# Patient Record
Sex: MALE | Race: WHITE | NOT HISPANIC OR LATINO | Employment: UNEMPLOYED | URBAN - NONMETROPOLITAN AREA
[De-identification: names, ages, dates, MRNs, and addresses within clinical notes are randomized per-mention and may not be internally consistent; named-entity substitution may affect disease eponyms.]

---

## 2019-03-05 ENCOUNTER — HOSPITAL ENCOUNTER (OUTPATIENT)
Facility: HOSPITAL | Age: 74
Setting detail: OBSERVATION
Discharge: HOME | End: 2019-03-08
Attending: EMERGENCY MEDICINE | Admitting: INTERNAL MEDICINE

## 2019-03-05 ENCOUNTER — APPOINTMENT (OUTPATIENT)
Dept: CT IMAGING | Facility: HOSPITAL | Age: 74
End: 2019-03-05

## 2019-03-05 ENCOUNTER — APPOINTMENT (OUTPATIENT)
Dept: GENERAL RADIOLOGY | Facility: HOSPITAL | Age: 74
End: 2019-03-05

## 2019-03-05 DIAGNOSIS — J18.9 HCAP (HEALTHCARE-ASSOCIATED PNEUMONIA): Primary | ICD-10-CM

## 2019-03-05 LAB
A-A DO2: 21.4 MMHG (ref 0–300)
ALBUMIN SERPL-MCNC: 4.1 G/DL (ref 3.4–4.8)
ALBUMIN/GLOB SERPL: 1.1 G/DL (ref 1.5–2.5)
ALP SERPL-CCNC: 110 U/L (ref 40–129)
ALT SERPL W P-5'-P-CCNC: 30 U/L (ref 10–44)
ANION GAP SERPL CALCULATED.3IONS-SCNC: 10.3 MMOL/L (ref 3.6–11.2)
ARTERIAL PATENCY WRIST A: POSITIVE
AST SERPL-CCNC: 32 U/L (ref 10–34)
ATMOSPHERIC PRESS: 732 MMHG
BASE EXCESS BLDA CALC-SCNC: 0 MMOL/L
BASOPHILS # BLD AUTO: 0.08 10*3/MM3 (ref 0–0.3)
BASOPHILS NFR BLD AUTO: 1.1 % (ref 0–2)
BDY SITE: ABNORMAL
BILIRUB SERPL-MCNC: 0.8 MG/DL (ref 0.2–1.8)
BNP SERPL-MCNC: 221 PG/ML (ref 0–100)
BODY TEMPERATURE: 98.6 C
BUN BLD-MCNC: 18 MG/DL (ref 7–21)
BUN/CREAT SERPL: 17.5 (ref 7–25)
CALCIUM SPEC-SCNC: 9.3 MG/DL (ref 7.7–10)
CHLORIDE SERPL-SCNC: 102 MMOL/L (ref 99–112)
CK MB SERPL-CCNC: 2.36 NG/ML (ref 0–5)
CK MB SERPL-RTO: 3.2 % (ref 0–3)
CK SERPL-CCNC: 74 U/L (ref 24–204)
CO2 SERPL-SCNC: 25.7 MMOL/L (ref 24.3–31.9)
COHGB MFR BLD: 1.6 % (ref 0–5)
CREAT BLD-MCNC: 1.03 MG/DL (ref 0.43–1.29)
CRP SERPL-MCNC: 2.1 MG/DL (ref 0–0.99)
CRP SERPL-MCNC: 2.88 MG/DL (ref 0–0.99)
D-LACTATE SERPL-SCNC: 1.5 MMOL/L (ref 0.5–2)
DEPRECATED RDW RBC AUTO: 60.1 FL (ref 37–54)
EOSINOPHIL # BLD AUTO: 0.15 10*3/MM3 (ref 0–0.7)
EOSINOPHIL NFR BLD AUTO: 2.1 % (ref 0–7)
ERYTHROCYTE [DISTWIDTH] IN BLOOD BY AUTOMATED COUNT: 18.5 % (ref 11.5–14.5)
FLUAV AG NPH QL: NEGATIVE
FLUBV AG NPH QL IA: NEGATIVE
GFR SERPL CREATININE-BSD FRML MDRD: 70 ML/MIN/1.73
GLOBULIN UR ELPH-MCNC: 3.6 GM/DL
GLUCOSE BLD-MCNC: 111 MG/DL (ref 70–110)
HCO3 BLDA-SCNC: 22.6 MMOL/L (ref 22–26)
HCT VFR BLD AUTO: 41.3 % (ref 42–52)
HCT VFR BLD CALC: 38 % (ref 42–52)
HGB BLD-MCNC: 13.5 G/DL (ref 14–18)
HGB BLDA-MCNC: 13 G/DL (ref 12–16)
HOLD SPECIMEN: NORMAL
HOROWITZ INDEX BLD+IHG-RTO: 21 %
IMM GRANULOCYTES # BLD AUTO: 0.04 10*3/MM3 (ref 0–0.03)
IMM GRANULOCYTES NFR BLD AUTO: 0.6 % (ref 0–0.5)
LYMPHOCYTES # BLD AUTO: 1.19 10*3/MM3 (ref 1–3)
LYMPHOCYTES NFR BLD AUTO: 17 % (ref 16–46)
M PNEUMO IGM SER QL: NEGATIVE
MCH RBC QN AUTO: 30.7 PG (ref 27–33)
MCHC RBC AUTO-ENTMCNC: 32.7 G/DL (ref 33–37)
MCV RBC AUTO: 93.9 FL (ref 80–94)
METHGB BLD QL: 0.4 % (ref 0–3)
MODALITY: ABNORMAL
MONOCYTES # BLD AUTO: 0.69 10*3/MM3 (ref 0.1–0.9)
MONOCYTES NFR BLD AUTO: 9.9 % (ref 0–12)
NEUTROPHILS # BLD AUTO: 4.83 10*3/MM3 (ref 1.4–6.5)
NEUTROPHILS NFR BLD AUTO: 69.3 % (ref 40–75)
NOTE: ABNORMAL
OSMOLALITY SERPL CALC.SUM OF ELEC: 278.3 MOSM/KG (ref 273–305)
OXYHGB MFR BLDV: 94.4 % (ref 85–100)
PCO2 BLDA: 30.9 MM HG (ref 35–45)
PCO2 TEMP ADJ BLD: ABNORMAL MM HG (ref 35–48)
PH BLDA: 7.48 PH UNITS (ref 7.35–7.45)
PH, TEMP CORRECTED: ABNORMAL PH UNITS (ref 7.35–7.45)
PLATELET # BLD AUTO: 303 10*3/MM3 (ref 130–400)
PMV BLD AUTO: 10.5 FL (ref 6–10)
PO2 BLDA: 85.4 MM HG (ref 80–100)
PO2 TEMP ADJ BLD: ABNORMAL MM HG (ref 83–108)
POTASSIUM BLD-SCNC: 3.9 MMOL/L (ref 3.5–5.3)
PROT SERPL-MCNC: 7.7 G/DL (ref 6–8)
RBC # BLD AUTO: 4.4 10*6/MM3 (ref 4.7–6.1)
SAO2 % BLDCOA: 96.3 % (ref 90–100)
SODIUM BLD-SCNC: 138 MMOL/L (ref 135–153)
TROPONIN I SERPL-MCNC: 0.05 NG/ML
TROPONIN I SERPL-MCNC: 0.06 NG/ML
WBC NRBC COR # BLD: 6.98 10*3/MM3 (ref 4.5–12.5)
WHOLE BLOOD HOLD SPECIMEN: NORMAL
WHOLE BLOOD HOLD SPECIMEN: NORMAL

## 2019-03-05 PROCEDURE — 71045 X-RAY EXAM CHEST 1 VIEW: CPT

## 2019-03-05 PROCEDURE — 83050 HGB METHEMOGLOBIN QUAN: CPT | Performed by: PHYSICIAN ASSISTANT

## 2019-03-05 PROCEDURE — 25010000002 METHYLPREDNISOLONE PER 125 MG: Performed by: PHYSICIAN ASSISTANT

## 2019-03-05 PROCEDURE — 86140 C-REACTIVE PROTEIN: CPT | Performed by: PHYSICIAN ASSISTANT

## 2019-03-05 PROCEDURE — 25010000002 IOPAMIDOL 61 % SOLUTION: Performed by: EMERGENCY MEDICINE

## 2019-03-05 PROCEDURE — 93010 ELECTROCARDIOGRAM REPORT: CPT | Performed by: INTERNAL MEDICINE

## 2019-03-05 PROCEDURE — 71045 X-RAY EXAM CHEST 1 VIEW: CPT | Performed by: RADIOLOGY

## 2019-03-05 PROCEDURE — 87040 BLOOD CULTURE FOR BACTERIA: CPT | Performed by: PHYSICIAN ASSISTANT

## 2019-03-05 PROCEDURE — 94799 UNLISTED PULMONARY SVC/PX: CPT

## 2019-03-05 PROCEDURE — 25010000002 VANCOMYCIN 5 G RECONSTITUTED SOLUTION 5,000 MG VIAL: Performed by: PHYSICIAN ASSISTANT

## 2019-03-05 PROCEDURE — 94640 AIRWAY INHALATION TREATMENT: CPT

## 2019-03-05 PROCEDURE — 87147 CULTURE TYPE IMMUNOLOGIC: CPT | Performed by: PHYSICIAN ASSISTANT

## 2019-03-05 PROCEDURE — 87804 INFLUENZA ASSAY W/OPTIC: CPT | Performed by: PHYSICIAN ASSISTANT

## 2019-03-05 PROCEDURE — 71275 CT ANGIOGRAPHY CHEST: CPT | Performed by: RADIOLOGY

## 2019-03-05 PROCEDURE — 83605 ASSAY OF LACTIC ACID: CPT | Performed by: PHYSICIAN ASSISTANT

## 2019-03-05 PROCEDURE — 99285 EMERGENCY DEPT VISIT HI MDM: CPT

## 2019-03-05 PROCEDURE — G0378 HOSPITAL OBSERVATION PER HR: HCPCS

## 2019-03-05 PROCEDURE — 87077 CULTURE AEROBIC IDENTIFY: CPT | Performed by: PHYSICIAN ASSISTANT

## 2019-03-05 PROCEDURE — 25010000002 PIPERACILLIN-TAZOBACTAM: Performed by: PHYSICIAN ASSISTANT

## 2019-03-05 PROCEDURE — 36600 WITHDRAWAL OF ARTERIAL BLOOD: CPT | Performed by: PHYSICIAN ASSISTANT

## 2019-03-05 PROCEDURE — 87070 CULTURE OTHR SPECIMN AEROBIC: CPT | Performed by: PHYSICIAN ASSISTANT

## 2019-03-05 PROCEDURE — 82375 ASSAY CARBOXYHB QUANT: CPT | Performed by: PHYSICIAN ASSISTANT

## 2019-03-05 PROCEDURE — 82553 CREATINE MB FRACTION: CPT | Performed by: PHYSICIAN ASSISTANT

## 2019-03-05 PROCEDURE — 96365 THER/PROPH/DIAG IV INF INIT: CPT

## 2019-03-05 PROCEDURE — 87205 SMEAR GRAM STAIN: CPT | Performed by: PHYSICIAN ASSISTANT

## 2019-03-05 PROCEDURE — 86738 MYCOPLASMA ANTIBODY: CPT | Performed by: NURSE PRACTITIONER

## 2019-03-05 PROCEDURE — 80053 COMPREHEN METABOLIC PANEL: CPT | Performed by: EMERGENCY MEDICINE

## 2019-03-05 PROCEDURE — 96375 TX/PRO/DX INJ NEW DRUG ADDON: CPT

## 2019-03-05 PROCEDURE — 85025 COMPLETE CBC W/AUTO DIFF WBC: CPT | Performed by: EMERGENCY MEDICINE

## 2019-03-05 PROCEDURE — 87186 SC STD MICRODIL/AGAR DIL: CPT | Performed by: PHYSICIAN ASSISTANT

## 2019-03-05 PROCEDURE — 25010000002 DIPHENHYDRAMINE PER 50 MG: Performed by: PHYSICIAN ASSISTANT

## 2019-03-05 PROCEDURE — 25010000002 CEFTRIAXONE: Performed by: NURSE PRACTITIONER

## 2019-03-05 PROCEDURE — 71275 CT ANGIOGRAPHY CHEST: CPT

## 2019-03-05 PROCEDURE — 82550 ASSAY OF CK (CPK): CPT | Performed by: PHYSICIAN ASSISTANT

## 2019-03-05 PROCEDURE — 82805 BLOOD GASES W/O2 SATURATION: CPT | Performed by: PHYSICIAN ASSISTANT

## 2019-03-05 PROCEDURE — 87150 DNA/RNA AMPLIFIED PROBE: CPT | Performed by: PHYSICIAN ASSISTANT

## 2019-03-05 PROCEDURE — 84484 ASSAY OF TROPONIN QUANT: CPT | Performed by: EMERGENCY MEDICINE

## 2019-03-05 PROCEDURE — 96367 TX/PROPH/DG ADDL SEQ IV INF: CPT

## 2019-03-05 PROCEDURE — 83880 ASSAY OF NATRIURETIC PEPTIDE: CPT | Performed by: EMERGENCY MEDICINE

## 2019-03-05 PROCEDURE — 36415 COLL VENOUS BLD VENIPUNCTURE: CPT

## 2019-03-05 PROCEDURE — 93005 ELECTROCARDIOGRAM TRACING: CPT | Performed by: EMERGENCY MEDICINE

## 2019-03-05 PROCEDURE — 84484 ASSAY OF TROPONIN QUANT: CPT | Performed by: PHYSICIAN ASSISTANT

## 2019-03-05 RX ORDER — IPRATROPIUM BROMIDE AND ALBUTEROL SULFATE 2.5; .5 MG/3ML; MG/3ML
3 SOLUTION RESPIRATORY (INHALATION) ONCE
Status: COMPLETED | OUTPATIENT
Start: 2019-03-05 | End: 2019-03-05

## 2019-03-05 RX ORDER — METHYLPREDNISOLONE SODIUM SUCCINATE 125 MG/2ML
125 INJECTION, POWDER, LYOPHILIZED, FOR SOLUTION INTRAMUSCULAR; INTRAVENOUS ONCE
Status: COMPLETED | OUTPATIENT
Start: 2019-03-05 | End: 2019-03-05

## 2019-03-05 RX ORDER — IPRATROPIUM BROMIDE AND ALBUTEROL SULFATE 2.5; .5 MG/3ML; MG/3ML
3 SOLUTION RESPIRATORY (INHALATION)
Status: DISCONTINUED | OUTPATIENT
Start: 2019-03-05 | End: 2019-03-06

## 2019-03-05 RX ORDER — SODIUM CHLORIDE 0.9 % (FLUSH) 0.9 %
10 SYRINGE (ML) INJECTION AS NEEDED
Status: DISCONTINUED | OUTPATIENT
Start: 2019-03-05 | End: 2019-03-08 | Stop reason: HOSPADM

## 2019-03-05 RX ORDER — DIPHENHYDRAMINE HYDROCHLORIDE 50 MG/ML
25 INJECTION INTRAMUSCULAR; INTRAVENOUS ONCE
Status: COMPLETED | OUTPATIENT
Start: 2019-03-05 | End: 2019-03-05

## 2019-03-05 RX ADMIN — VANCOMYCIN HYDROCHLORIDE 1500 MG: 5 INJECTION, POWDER, LYOPHILIZED, FOR SOLUTION INTRAVENOUS at 19:25

## 2019-03-05 RX ADMIN — IPRATROPIUM BROMIDE AND ALBUTEROL SULFATE 3 ML: .5; 3 SOLUTION RESPIRATORY (INHALATION) at 18:39

## 2019-03-05 RX ADMIN — PIPERACILLIN SODIUM,TAZOBACTAM SODIUM 3.38 G: 3; .375 INJECTION, POWDER, FOR SOLUTION INTRAVENOUS at 18:52

## 2019-03-05 RX ADMIN — DIPHENHYDRAMINE HYDROCHLORIDE 25 MG: 50 INJECTION INTRAMUSCULAR; INTRAVENOUS at 20:24

## 2019-03-05 RX ADMIN — IPRATROPIUM BROMIDE AND ALBUTEROL SULFATE 3 ML: .5; 3 SOLUTION RESPIRATORY (INHALATION) at 20:16

## 2019-03-05 RX ADMIN — CEFTRIAXONE 2 G: 2 INJECTION, POWDER, FOR SOLUTION INTRAMUSCULAR; INTRAVENOUS at 23:30

## 2019-03-05 RX ADMIN — IOPAMIDOL 100 ML: 612 INJECTION, SOLUTION INTRAVENOUS at 21:08

## 2019-03-05 RX ADMIN — METHYLPREDNISOLONE SODIUM SUCCINATE 125 MG: 125 INJECTION, POWDER, FOR SOLUTION INTRAMUSCULAR; INTRAVENOUS at 20:24

## 2019-03-05 NOTE — ED PROVIDER NOTES
Subjective     Shortness of Breath   Severity:  Severe  Onset quality:  Gradual  Duration:  1 week  Timing:  Constant  Progression:  Worsening  Chronicity:  New  Context comment:  Patient was recently admitted to [facility] for pneumonia.  He states he got out last Thursday or Friday and is not any better.  Associated symptoms: fever (previously), sputum production and wheezing    Associated symptoms: no abdominal pain and no chest pain        Review of Systems   Constitutional: Positive for fever (previously).   HENT: Negative.    Respiratory: Positive for sputum production, shortness of breath and wheezing.    Cardiovascular: Negative.  Negative for chest pain.   Gastrointestinal: Negative.  Negative for abdominal pain.   Endocrine: Negative.    Genitourinary: Negative.  Negative for dysuria.   Skin: Negative.    Neurological: Negative.    Psychiatric/Behavioral: Negative.    All other systems reviewed and are negative.      Past Medical History:   Diagnosis Date   • Arthritis    • Atrial fibrillation (CMS/HCC)    • Cancer (CMS/HCC)    • COPD (chronic obstructive pulmonary disease) (CMS/HCC)    • Diabetes mellitus (CMS/HCC)    • Hyperlipidemia    • Hypertension    • Myocardial infarction (CMS/HCC)    • Pneumonia        Allergies   Allergen Reactions   • Other Unknown (See Comments)     Patient stated he had a reaction to an antibiotic he took for pneumonia but cannot remember the name       Past Surgical History:   Procedure Laterality Date   • CARDIOVERSION     • CHOLECYSTECTOMY     • CORONARY STENT PLACEMENT         History reviewed. No pertinent family history.    Social History     Socioeconomic History   • Marital status: Single     Spouse name: Not on file   • Number of children: Not on file   • Years of education: Not on file   • Highest education level: Not on file   Tobacco Use   • Smoking status: Former Smoker     Years: 45.00   Substance and Sexual Activity   • Alcohol use: No     Frequency:  Never   • Drug use: No   • Sexual activity: Defer           Objective   Physical Exam   Constitutional: He is oriented to person, place, and time. He appears well-developed and well-nourished. He appears distressed.   HENT:   Head: Normocephalic and atraumatic.   Right Ear: External ear normal.   Left Ear: External ear normal.   Nose: Nose normal.   Eyes: Conjunctivae and EOM are normal. Pupils are equal, round, and reactive to light.   Neck: Normal range of motion. Neck supple. No JVD present. No tracheal deviation present.   Cardiovascular: Normal rate, regular rhythm and normal heart sounds.   No murmur heard.  Pulmonary/Chest: Breath sounds normal. He is in respiratory distress. He has no wheezes.   Abdominal: Soft. Bowel sounds are normal. There is no tenderness.   Musculoskeletal: Normal range of motion. He exhibits no edema or deformity.   Neurological: He is alert and oriented to person, place, and time. No cranial nerve deficit.   Skin: Skin is warm and dry. No rash noted. He is not diaphoretic. No erythema. No pallor.   Psychiatric: He has a normal mood and affect. His behavior is normal. Thought content normal.   Nursing note and vitals reviewed.      Procedures           ED Course  ED Course as of Mar 06 0442   Tue Mar 05, 2019   2024 Patient complains of SOB and worsening wheezing while getting vancomycin. Patient states that this also happens when he gets vancomycin. Patient stated that he did not tell the nurses because he wanted to see if it would happen again. Patient had increased respirations on exam.  Patient given breathing tx, solumedrol, and benadryl.   [ML]   2105 Patient noted to have normal respirations on exam. States that he is feeling much better.  Vancomycin was stopped. Patient noted to remind all staff that he is allergic.    [ML]   2203 No PE. Emphysematous changes in both lung apices are present. Patchy bilateral somewhat nodular nonspecific infiiltrates as discused above are  supsivious for pneumonia but cannot exclude underlying granulomatous or metastaic disease.  A 4mm soft tissue density pulm nodule in the right upper lobe is present.  Partially calcified mediastinal lymph nodes are nonspecific possibly due to old granulomatous disease per [facility]  CT Chest Pulmonary Embolism With Contrast [ML]   2230 I have reviewed medical records from [facility].  Per medical records patient was admitted back in January for atrial flutter with RVR.  At that time he was evaluated by cardiology and placed on Eliquis and Coreg.  Patient appears to have return to the ER on February 28 for shortness of breath and palpitations.  At that time he also was in atrial flutter with RVR and was corrected with Cardizem all in the ER.  Patient also had bilateral lower lung infiltrates suspicious for pneumonia on chest x-ray while in ER.  He was admitted for atrial flutter with RVR and healthcare acquired pneumonia.  Patient was started on Vanco and Zosyn.  Upon further evaluation of the chest x-ray it was not evident that he had pneumonia and DC'd Vanco and Zosyn.  He was evaluated then again by cardiology and discharged home on March 1.  He was discharged home on his normal medication such as Norco, Synthroid, omeprazole, Symbicort, Lasix, aspirin, metoprolol, atorvastatin, Plavix, Eliquis, albuterol inhaler and promethazine.  He was to follow-up with cardiology for further titration of medications.  Patient tells me that he did not feel like he was getting any better and does not want to return to [place].    Medical records will be placed in chart as well.  [ML]   3389 Discussed with Kanwal she will accept to observation for Dr. Mariano   [ML]      ED Course User Index  [ML] Ambika Lui PA                  [facility]      Final diagnoses:   HCAP (healthcare-associated pneumonia)            Ambika Lui PA  03/06/19 7033

## 2019-03-05 NOTE — ED NOTES
Called [facility] trying to get medical records      Tremaine Farris  03/05/19 5271

## 2019-03-06 PROBLEM — I50.9 CHF (CONGESTIVE HEART FAILURE) (HCC): Status: ACTIVE | Noted: 2019-03-06

## 2019-03-06 PROBLEM — J44.9 COPD (CHRONIC OBSTRUCTIVE PULMONARY DISEASE) (HCC): Status: ACTIVE | Noted: 2019-03-06

## 2019-03-06 LAB
ANION GAP SERPL CALCULATED.3IONS-SCNC: 9.4 MMOL/L (ref 3.6–11.2)
BACTERIA BLD CULT: ABNORMAL
BASOPHILS # BLD AUTO: 0.01 10*3/MM3 (ref 0–0.3)
BASOPHILS NFR BLD AUTO: 0.2 % (ref 0–2)
BUN BLD-MCNC: 15 MG/DL (ref 7–21)
BUN/CREAT SERPL: 15.8 (ref 7–25)
CALCIUM SPEC-SCNC: 8.8 MG/DL (ref 7.7–10)
CHLORIDE SERPL-SCNC: 105 MMOL/L (ref 99–112)
CO2 SERPL-SCNC: 21.6 MMOL/L (ref 24.3–31.9)
CREAT BLD-MCNC: 0.95 MG/DL (ref 0.43–1.29)
CRP SERPL-MCNC: 2.02 MG/DL (ref 0–0.99)
DEPRECATED RDW RBC AUTO: 62.9 FL (ref 37–54)
EOSINOPHIL # BLD AUTO: 0 10*3/MM3 (ref 0–0.7)
EOSINOPHIL NFR BLD AUTO: 0 % (ref 0–7)
ERYTHROCYTE [DISTWIDTH] IN BLOOD BY AUTOMATED COUNT: 18.7 % (ref 11.5–14.5)
GFR SERPL CREATININE-BSD FRML MDRD: 77 ML/MIN/1.73
GLUCOSE BLD-MCNC: 148 MG/DL (ref 70–110)
HCT VFR BLD AUTO: 40.1 % (ref 42–52)
HGB BLD-MCNC: 12.7 G/DL (ref 14–18)
IMM GRANULOCYTES # BLD AUTO: 0.01 10*3/MM3 (ref 0–0.03)
IMM GRANULOCYTES NFR BLD AUTO: 0.2 % (ref 0–0.5)
L PNEUMO1 AG UR QL IA: NEGATIVE
LYMPHOCYTES # BLD AUTO: 0.54 10*3/MM3 (ref 1–3)
LYMPHOCYTES NFR BLD AUTO: 8.8 % (ref 16–46)
MCH RBC QN AUTO: 30.2 PG (ref 27–33)
MCHC RBC AUTO-ENTMCNC: 31.7 G/DL (ref 33–37)
MCV RBC AUTO: 95.5 FL (ref 80–94)
MONOCYTES # BLD AUTO: 0.1 10*3/MM3 (ref 0.1–0.9)
MONOCYTES NFR BLD AUTO: 1.6 % (ref 0–12)
NEUTROPHILS # BLD AUTO: 5.49 10*3/MM3 (ref 1.4–6.5)
NEUTROPHILS NFR BLD AUTO: 89.2 % (ref 40–75)
OSMOLALITY SERPL CALC.SUM OF ELEC: 275.5 MOSM/KG (ref 273–305)
PLATELET # BLD AUTO: 257 10*3/MM3 (ref 130–400)
PMV BLD AUTO: 10.6 FL (ref 6–10)
POTASSIUM BLD-SCNC: 3.9 MMOL/L (ref 3.5–5.3)
RBC # BLD AUTO: 4.2 10*6/MM3 (ref 4.7–6.1)
SODIUM BLD-SCNC: 136 MMOL/L (ref 135–153)
TROPONIN I SERPL-MCNC: 0.03 NG/ML
TROPONIN I SERPL-MCNC: 0.04 NG/ML
WBC NRBC COR # BLD: 6.15 10*3/MM3 (ref 4.5–12.5)

## 2019-03-06 PROCEDURE — G0378 HOSPITAL OBSERVATION PER HR: HCPCS

## 2019-03-06 PROCEDURE — 25010000002 FUROSEMIDE PER 20 MG: Performed by: INTERNAL MEDICINE

## 2019-03-06 PROCEDURE — 25010000002 CEFTRIAXONE: Performed by: NURSE PRACTITIONER

## 2019-03-06 PROCEDURE — 86140 C-REACTIVE PROTEIN: CPT | Performed by: NURSE PRACTITIONER

## 2019-03-06 PROCEDURE — 96367 TX/PROPH/DG ADDL SEQ IV INF: CPT

## 2019-03-06 PROCEDURE — 84484 ASSAY OF TROPONIN QUANT: CPT | Performed by: NURSE PRACTITIONER

## 2019-03-06 PROCEDURE — 80048 BASIC METABOLIC PNL TOTAL CA: CPT | Performed by: NURSE PRACTITIONER

## 2019-03-06 PROCEDURE — 25010000002 METHYLPREDNISOLONE PER 125 MG: Performed by: INTERNAL MEDICINE

## 2019-03-06 PROCEDURE — 87899 AGENT NOS ASSAY W/OPTIC: CPT | Performed by: NURSE PRACTITIONER

## 2019-03-06 PROCEDURE — 96376 TX/PRO/DX INJ SAME DRUG ADON: CPT

## 2019-03-06 PROCEDURE — 99204 OFFICE O/P NEW MOD 45 MIN: CPT | Performed by: NURSE PRACTITIONER

## 2019-03-06 PROCEDURE — 94799 UNLISTED PULMONARY SVC/PX: CPT

## 2019-03-06 PROCEDURE — 96375 TX/PRO/DX INJ NEW DRUG ADDON: CPT

## 2019-03-06 PROCEDURE — 84484 ASSAY OF TROPONIN QUANT: CPT | Performed by: PHYSICIAN ASSISTANT

## 2019-03-06 PROCEDURE — 85025 COMPLETE CBC W/AUTO DIFF WBC: CPT | Performed by: NURSE PRACTITIONER

## 2019-03-06 RX ORDER — HYDROCHLOROTHIAZIDE 12.5 MG/1
25 CAPSULE, GELATIN COATED ORAL DAILY
Status: DISCONTINUED | OUTPATIENT
Start: 2019-03-06 | End: 2019-03-08 | Stop reason: HOSPADM

## 2019-03-06 RX ORDER — ACETAMINOPHEN 325 MG/1
650 TABLET ORAL EVERY 4 HOURS PRN
Status: DISCONTINUED | OUTPATIENT
Start: 2019-03-06 | End: 2019-03-08 | Stop reason: HOSPADM

## 2019-03-06 RX ORDER — POTASSIUM CHLORIDE 1.5 G/1.77G
40 POWDER, FOR SOLUTION ORAL AS NEEDED
Status: DISCONTINUED | OUTPATIENT
Start: 2019-03-06 | End: 2019-03-08 | Stop reason: HOSPADM

## 2019-03-06 RX ORDER — MAGNESIUM SULFATE HEPTAHYDRATE 40 MG/ML
4 INJECTION, SOLUTION INTRAVENOUS AS NEEDED
Status: DISCONTINUED | OUTPATIENT
Start: 2019-03-06 | End: 2019-03-08 | Stop reason: HOSPADM

## 2019-03-06 RX ORDER — NITROGLYCERIN 0.4 MG/1
0.4 TABLET SUBLINGUAL
Status: DISCONTINUED | OUTPATIENT
Start: 2019-03-06 | End: 2019-03-08 | Stop reason: HOSPADM

## 2019-03-06 RX ORDER — GUAIFENESIN/DEXTROMETHORPHAN 100-10MG/5
5 SYRUP ORAL EVERY 4 HOURS PRN
Status: DISCONTINUED | OUTPATIENT
Start: 2019-03-06 | End: 2019-03-08 | Stop reason: HOSPADM

## 2019-03-06 RX ORDER — METOPROLOL SUCCINATE 50 MG/1
50 TABLET, EXTENDED RELEASE ORAL DAILY
Status: DISCONTINUED | OUTPATIENT
Start: 2019-03-06 | End: 2019-03-08 | Stop reason: HOSPADM

## 2019-03-06 RX ORDER — BENZONATATE 100 MG/1
100 CAPSULE ORAL 3 TIMES DAILY PRN
Status: ON HOLD | COMMUNITY
End: 2019-04-23

## 2019-03-06 RX ORDER — PROMETHAZINE HYDROCHLORIDE 12.5 MG/1
12.5 TABLET ORAL EVERY 8 HOURS PRN
Status: DISCONTINUED | OUTPATIENT
Start: 2019-03-06 | End: 2019-03-08 | Stop reason: HOSPADM

## 2019-03-06 RX ORDER — BENZONATATE 100 MG/1
100 CAPSULE ORAL 3 TIMES DAILY PRN
Status: DISCONTINUED | OUTPATIENT
Start: 2019-03-06 | End: 2019-03-08 | Stop reason: HOSPADM

## 2019-03-06 RX ORDER — POTASSIUM CHLORIDE 20 MEQ/1
40 TABLET, EXTENDED RELEASE ORAL AS NEEDED
Status: DISCONTINUED | OUTPATIENT
Start: 2019-03-06 | End: 2019-03-08 | Stop reason: HOSPADM

## 2019-03-06 RX ORDER — CLOPIDOGREL BISULFATE 75 MG/1
75 TABLET ORAL DAILY
COMMUNITY

## 2019-03-06 RX ORDER — FUROSEMIDE 10 MG/ML
40 INJECTION INTRAMUSCULAR; INTRAVENOUS ONCE
Status: COMPLETED | OUTPATIENT
Start: 2019-03-06 | End: 2019-03-06

## 2019-03-06 RX ORDER — MAGNESIUM SULFATE HEPTAHYDRATE 40 MG/ML
2 INJECTION, SOLUTION INTRAVENOUS AS NEEDED
Status: DISCONTINUED | OUTPATIENT
Start: 2019-03-06 | End: 2019-03-08 | Stop reason: HOSPADM

## 2019-03-06 RX ORDER — PANTOPRAZOLE SODIUM 40 MG/1
40 TABLET, DELAYED RELEASE ORAL EVERY MORNING
Status: DISCONTINUED | OUTPATIENT
Start: 2019-03-06 | End: 2019-03-08 | Stop reason: HOSPADM

## 2019-03-06 RX ORDER — OMEPRAZOLE 20 MG/1
20 CAPSULE, DELAYED RELEASE ORAL 2 TIMES DAILY
COMMUNITY

## 2019-03-06 RX ORDER — HYDROCHLOROTHIAZIDE 12.5 MG/1
25 CAPSULE, GELATIN COATED ORAL DAILY
Status: ON HOLD | COMMUNITY
End: 2019-04-23

## 2019-03-06 RX ORDER — L.ACID,PARA/B.BIFIDUM/S.THERM 8B CELL
1 CAPSULE ORAL DAILY
Status: DISCONTINUED | OUTPATIENT
Start: 2019-03-06 | End: 2019-03-08 | Stop reason: HOSPADM

## 2019-03-06 RX ORDER — IPRATROPIUM BROMIDE AND ALBUTEROL SULFATE 2.5; .5 MG/3ML; MG/3ML
3 SOLUTION RESPIRATORY (INHALATION) EVERY 6 HOURS PRN
Status: DISCONTINUED | OUTPATIENT
Start: 2019-03-06 | End: 2019-03-06 | Stop reason: SDUPTHER

## 2019-03-06 RX ORDER — ASPIRIN 81 MG/1
81 TABLET ORAL DAILY
Status: DISCONTINUED | OUTPATIENT
Start: 2019-03-06 | End: 2019-03-08 | Stop reason: HOSPADM

## 2019-03-06 RX ORDER — ONDANSETRON 4 MG/1
4 TABLET, FILM COATED ORAL EVERY 6 HOURS PRN
Status: DISCONTINUED | OUTPATIENT
Start: 2019-03-06 | End: 2019-03-08 | Stop reason: HOSPADM

## 2019-03-06 RX ORDER — POTASSIUM CHLORIDE 29.8 MG/ML
20 INJECTION INTRAVENOUS
Status: DISCONTINUED | OUTPATIENT
Start: 2019-03-06 | End: 2019-03-08 | Stop reason: HOSPADM

## 2019-03-06 RX ORDER — LEVOTHYROXINE SODIUM 0.1 MG/1
100 TABLET ORAL DAILY
COMMUNITY

## 2019-03-06 RX ORDER — METOPROLOL SUCCINATE 50 MG/1
50 TABLET, EXTENDED RELEASE ORAL DAILY
Status: ON HOLD | COMMUNITY
End: 2019-04-23

## 2019-03-06 RX ORDER — ATORVASTATIN CALCIUM 20 MG/1
20 TABLET, FILM COATED ORAL NIGHTLY
Status: DISCONTINUED | OUTPATIENT
Start: 2019-03-06 | End: 2019-03-08 | Stop reason: HOSPADM

## 2019-03-06 RX ORDER — IPRATROPIUM BROMIDE AND ALBUTEROL SULFATE 2.5; .5 MG/3ML; MG/3ML
3 SOLUTION RESPIRATORY (INHALATION) EVERY 6 HOURS PRN
Status: DISCONTINUED | OUTPATIENT
Start: 2019-03-06 | End: 2019-03-08 | Stop reason: HOSPADM

## 2019-03-06 RX ORDER — CLOPIDOGREL BISULFATE 75 MG/1
75 TABLET ORAL DAILY
Status: DISCONTINUED | OUTPATIENT
Start: 2019-03-06 | End: 2019-03-08 | Stop reason: HOSPADM

## 2019-03-06 RX ORDER — SODIUM CHLORIDE 0.9 % (FLUSH) 0.9 %
3 SYRINGE (ML) INJECTION EVERY 12 HOURS SCHEDULED
Status: DISCONTINUED | OUTPATIENT
Start: 2019-03-06 | End: 2019-03-08 | Stop reason: HOSPADM

## 2019-03-06 RX ORDER — CARVEDILOL 6.25 MG/1
6.25 TABLET ORAL 2 TIMES DAILY WITH MEALS
Status: DISCONTINUED | OUTPATIENT
Start: 2019-03-06 | End: 2019-03-08 | Stop reason: HOSPADM

## 2019-03-06 RX ORDER — ONDANSETRON 4 MG/1
4 TABLET, ORALLY DISINTEGRATING ORAL EVERY 6 HOURS PRN
Status: DISCONTINUED | OUTPATIENT
Start: 2019-03-06 | End: 2019-03-08 | Stop reason: HOSPADM

## 2019-03-06 RX ORDER — PROMETHAZINE HYDROCHLORIDE 12.5 MG/1
12.5 TABLET ORAL EVERY 8 HOURS PRN
Status: ON HOLD | COMMUNITY
End: 2019-04-23

## 2019-03-06 RX ORDER — CALCIUM CARBONATE 200(500)MG
2 TABLET,CHEWABLE ORAL 2 TIMES DAILY PRN
Status: DISCONTINUED | OUTPATIENT
Start: 2019-03-06 | End: 2019-03-08 | Stop reason: HOSPADM

## 2019-03-06 RX ORDER — ATORVASTATIN CALCIUM 20 MG/1
20 TABLET, FILM COATED ORAL NIGHTLY
COMMUNITY

## 2019-03-06 RX ORDER — ONDANSETRON 2 MG/ML
4 INJECTION INTRAMUSCULAR; INTRAVENOUS EVERY 6 HOURS PRN
Status: DISCONTINUED | OUTPATIENT
Start: 2019-03-06 | End: 2019-03-08 | Stop reason: HOSPADM

## 2019-03-06 RX ORDER — LEVOTHYROXINE SODIUM 0.1 MG/1
100 TABLET ORAL DAILY
Status: DISCONTINUED | OUTPATIENT
Start: 2019-03-06 | End: 2019-03-08 | Stop reason: HOSPADM

## 2019-03-06 RX ORDER — ASPIRIN 81 MG/1
81 TABLET ORAL DAILY
COMMUNITY

## 2019-03-06 RX ORDER — CARVEDILOL 6.25 MG/1
6.25 TABLET ORAL 2 TIMES DAILY WITH MEALS
Status: ON HOLD | COMMUNITY
End: 2019-04-23

## 2019-03-06 RX ORDER — IPRATROPIUM BROMIDE AND ALBUTEROL SULFATE 2.5; .5 MG/3ML; MG/3ML
3 SOLUTION RESPIRATORY (INHALATION)
Status: DISCONTINUED | OUTPATIENT
Start: 2019-03-06 | End: 2019-03-06

## 2019-03-06 RX ORDER — SODIUM CHLORIDE 0.9 % (FLUSH) 0.9 %
3-10 SYRINGE (ML) INJECTION AS NEEDED
Status: DISCONTINUED | OUTPATIENT
Start: 2019-03-06 | End: 2019-03-08 | Stop reason: HOSPADM

## 2019-03-06 RX ORDER — METHYLPREDNISOLONE SODIUM SUCCINATE 125 MG/2ML
60 INJECTION, POWDER, LYOPHILIZED, FOR SOLUTION INTRAMUSCULAR; INTRAVENOUS EVERY 12 HOURS
Status: DISCONTINUED | OUTPATIENT
Start: 2019-03-06 | End: 2019-03-08

## 2019-03-06 RX ADMIN — CEFTRIAXONE 2 G: 2 INJECTION, POWDER, FOR SOLUTION INTRAMUSCULAR; INTRAVENOUS at 19:56

## 2019-03-06 RX ADMIN — SODIUM CHLORIDE, PRESERVATIVE FREE 3 ML: 5 INJECTION INTRAVENOUS at 01:37

## 2019-03-06 RX ADMIN — ATORVASTATIN CALCIUM 20 MG: 20 TABLET, FILM COATED ORAL at 19:56

## 2019-03-06 RX ADMIN — CLOPIDOGREL 75 MG: 75 TABLET, FILM COATED ORAL at 08:56

## 2019-03-06 RX ADMIN — ATORVASTATIN CALCIUM 20 MG: 20 TABLET, FILM COATED ORAL at 01:36

## 2019-03-06 RX ADMIN — APIXABAN 5 MG: 5 TABLET, FILM COATED ORAL at 19:56

## 2019-03-06 RX ADMIN — ASPIRIN 81 MG: 81 TABLET ORAL at 08:56

## 2019-03-06 RX ADMIN — DOXYCYCLINE 100 MG: 100 INJECTION, POWDER, LYOPHILIZED, FOR SOLUTION INTRAVENOUS at 20:57

## 2019-03-06 RX ADMIN — DRONEDARONE 400 MG: 400 TABLET, FILM COATED ORAL at 16:26

## 2019-03-06 RX ADMIN — SODIUM CHLORIDE, PRESERVATIVE FREE 3 ML: 5 INJECTION INTRAVENOUS at 19:57

## 2019-03-06 RX ADMIN — METHYLPREDNISOLONE SODIUM SUCCINATE 60 MG: 125 INJECTION, POWDER, FOR SOLUTION INTRAMUSCULAR; INTRAVENOUS at 19:57

## 2019-03-06 RX ADMIN — LEVOTHYROXINE SODIUM 100 MCG: 100 TABLET ORAL at 08:56

## 2019-03-06 RX ADMIN — CARVEDILOL 6.25 MG: 6.25 TABLET, FILM COATED ORAL at 16:26

## 2019-03-06 RX ADMIN — METHYLPREDNISOLONE SODIUM SUCCINATE 60 MG: 125 INJECTION, POWDER, FOR SOLUTION INTRAMUSCULAR; INTRAVENOUS at 12:17

## 2019-03-06 RX ADMIN — HYDROCHLOROTHIAZIDE 25 MG: 12.5 CAPSULE, GELATIN COATED ORAL at 08:57

## 2019-03-06 RX ADMIN — SODIUM CHLORIDE, PRESERVATIVE FREE 3 ML: 5 INJECTION INTRAVENOUS at 09:15

## 2019-03-06 RX ADMIN — DRONEDARONE 400 MG: 400 TABLET, FILM COATED ORAL at 08:57

## 2019-03-06 RX ADMIN — METOPROLOL SUCCINATE 50 MG: 50 TABLET, FILM COATED, EXTENDED RELEASE ORAL at 08:56

## 2019-03-06 RX ADMIN — IPRATROPIUM BROMIDE AND ALBUTEROL SULFATE 3 ML: .5; 3 SOLUTION RESPIRATORY (INHALATION) at 06:40

## 2019-03-06 RX ADMIN — DOXYCYCLINE 100 MG: 100 INJECTION, POWDER, LYOPHILIZED, FOR SOLUTION INTRAVENOUS at 12:17

## 2019-03-06 RX ADMIN — CARVEDILOL 6.25 MG: 6.25 TABLET, FILM COATED ORAL at 08:57

## 2019-03-06 RX ADMIN — APIXABAN 5 MG: 5 TABLET, FILM COATED ORAL at 08:56

## 2019-03-06 RX ADMIN — DOXYCYCLINE 100 MG: 100 INJECTION, POWDER, LYOPHILIZED, FOR SOLUTION INTRAVENOUS at 01:36

## 2019-03-06 RX ADMIN — IPRATROPIUM BROMIDE AND ALBUTEROL SULFATE 3 ML: .5; 3 SOLUTION RESPIRATORY (INHALATION) at 00:59

## 2019-03-06 RX ADMIN — FUROSEMIDE 40 MG: 10 INJECTION, SOLUTION INTRAMUSCULAR; INTRAVENOUS at 12:17

## 2019-03-06 RX ADMIN — Medication 1 CAPSULE: at 12:17

## 2019-03-06 NOTE — ED NOTES
Called Kanwal CRONIN with observation unit. LORNE PORTER is on the line with her at this time.      Symes, Heather  03/05/19 8159

## 2019-03-06 NOTE — ED NOTES
Explained CT with IV contrast dye at this time with the patient at this time. Patient reports that he has had contrast in the past and it sometimes makes him nauseous. Explained all risks and benefits. Patient verbalized understanding and denies any questions. Patient agreeable to have exam performed; contrast consent was signed at this time and placed on the chart.      Tanisha Jovel RN  03/05/19 2016

## 2019-03-06 NOTE — PROGRESS NOTES
Discharge Planning Assessment  [facility]     Patient Name: Alexandru Maxwell  MRN: [id number]  Today's Date: 3/6/2019    Admit Date: 3/5/2019    Discharge Needs Assessment     Row Name 03/06/19 1140       Living Environment    Lives With  child(felicia), adult  (Pended)     Name(s) of Who Lives With Patient  Pt lives with his adult son, Keyon.  (Pended)     Current Living Arrangements  home/apartment/condo  (Pended)     Primary Care Provided by  self  (Pended)     Family Caregiver if Needed  child(felicia), adult;other relative(s)  (Pended)     Family Caregiver Names  Pt has numerous family members who can care for him, that also live near him.   (Pended)     Quality of Family Relationships  helpful;involved  (Pended)     Able to Return to Prior Arrangements  yes  (Pended)        Transition Planning    Patient/Family Anticipates Transition to  home  (Pended)     Transportation Anticipated  family or friend will provide  (Pended)        Discharge Needs Assessment    Equipment Currently Used at Home  none  (Pended)     Equipment Needed After Discharge  none  (Pended)         Discharge Plan     Row Name 03/06/19 1141       Plan    Plan  Pt lives at home with his adult son, Keyon. Pt also has several other family members that can assist him if needed. Pt does not receive HH services, or use any DME. Pt does not have a POA or a living will. Pt uses [facility] in [place], and his PCP is Ele Chan. Pt plans to return home at discharge with transportation provided by his son or another family member. SS will follow.  (Pended)     Patient/Family in Agreement with Plan  yes  (Pended)             Demographic Summary     Row Name 03/06/19 1139       General Information    Admission Type  inpatient  (Pended)     Referral Source  nursing  (Pended)     Reason for Consult  discharge planning  (Pended)     Preferred Language  English  (Pended)      Used During This Interaction  no  (Pended)             Mariola Mckenzie

## 2019-03-06 NOTE — ED NOTES
Pt states he was recently discharged from [facility] with pneumonia. Pt states he came in due to worsening SOB. At this time pt states he is feeling better and only becomes SOB if he tries to get up. All lung sounds are coarse with expiratory wheezing. Pt is wearing mask. Friend at bedside. Cover and pillow given.      Scarlett Guerrero, RN  03/05/19 1939

## 2019-03-06 NOTE — PLAN OF CARE
Problem: Fall Risk (Adult)  Goal: Absence of Fall  Outcome: Ongoing (interventions implemented as appropriate)   03/06/19 1108   Fall Risk (Adult)   Absence of Fall making progress toward outcome       Problem: Skin Injury Risk (Adult)  Goal: Skin Health and Integrity  Outcome: Ongoing (interventions implemented as appropriate)   03/06/19 1108   Skin Injury Risk (Adult)   Skin Health and Integrity making progress toward outcome       Problem: Pneumonia (Adult)  Goal: Signs and Symptoms of Listed Potential Problems Will be Absent, Minimized or Managed (Pneumonia)  Outcome: Ongoing (interventions implemented as appropriate)   03/06/19 0011   Goal/Outcome Evaluation   Problems Assessed (Pneumonia) all   Problems Present (Pneumonia) respiratory compromise       Problem: Patient Care Overview  Goal: Plan of Care Review  Outcome: Ongoing (interventions implemented as appropriate)   03/06/19 0830   Coping/Psychosocial   Plan of Care Reviewed With patient     Goal: Discharge Needs Assessment  Outcome: Ongoing (interventions implemented as appropriate)   03/06/19 0013 03/06/19 0015   Disability   Equipment Currently Used at Home --  glucometer   Discharge Needs Assessment   Patient/Family Anticipates Transition to home --    Patient/Family Anticipated Services at Transition none --    Transportation Anticipated family or friend will provide;car, drives self --

## 2019-03-06 NOTE — PLAN OF CARE
Problem: Fall Risk (Adult)  Goal: Identify Related Risk Factors and Signs and Symptoms  Outcome: Ongoing (interventions implemented as appropriate)      Problem: Skin Injury Risk (Adult)  Goal: Identify Related Risk Factors and Signs and Symptoms  Outcome: Ongoing (interventions implemented as appropriate)      Problem: Pneumonia (Adult)  Goal: Signs and Symptoms of Listed Potential Problems Will be Absent, Minimized or Managed (Pneumonia)  Outcome: Ongoing (interventions implemented as appropriate)

## 2019-03-06 NOTE — PLAN OF CARE
Problem: Fall Risk (Adult)  Goal: Absence of Fall  Outcome: Ongoing (interventions implemented as appropriate)   03/06/19 1104   Fall Risk (Adult)   Absence of Fall making progress toward outcome       Problem: Skin Injury Risk (Adult)  Goal: Skin Health and Integrity  Outcome: Ongoing (interventions implemented as appropriate)   03/06/19 1104   Skin Injury Risk (Adult)   Skin Health and Integrity making progress toward outcome       Problem: Pneumonia (Adult)  Goal: Signs and Symptoms of Listed Potential Problems Will be Absent, Minimized or Managed (Pneumonia)  Outcome: Ongoing (interventions implemented as appropriate)   03/06/19 0011   Goal/Outcome Evaluation   Problems Assessed (Pneumonia) all   Problems Present (Pneumonia) respiratory compromise       Problem: Patient Care Overview  Goal: Plan of Care Review  Outcome: Ongoing (interventions implemented as appropriate)   03/06/19 0830   Coping/Psychosocial   Plan of Care Reviewed With patient     Goal: Discharge Needs Assessment  Outcome: Ongoing (interventions implemented as appropriate)   03/06/19 0013 03/06/19 0015   Disability   Equipment Currently Used at Home --  glucometer   Discharge Needs Assessment   Patient/Family Anticipates Transition to home --    Patient/Family Anticipated Services at Transition none --    Transportation Anticipated family or friend will provide;car, drives self --

## 2019-03-06 NOTE — PHARMACY PATIENT ASSISTANCE
Pharmacy checked on price of home medication Eliquis. Last filled 2/1/19 at [facility] in [place] for 28 day supply for $7.84 copay. Patient states he has been taking it every day, but that he lost the medication before coming into the hospital. [facility] states medication is due for a refill and medication will have same $7.84 copay when picked up. Informed patient of this and he stated this was affordable for him. No issues anticipated at this time.    Thank you,  Irene King, Pharmacy Intern  03/06/19  9:39 AM

## 2019-03-06 NOTE — CONSULTS
Inpatient Cardiology Consult  Consult performed by: Tayler Wilson APRN  Consult ordered by: Kelin Maza APRN        Date of Admit: 3/5/2019  Date of Consult: 03/06/19  No ref. provider found  Alexandru Maxwell  1945    Consulting Physician: Sunny Arellano MD    Cardiology consultation    Reason for consultation:  Troponin elevation, abnormal EKG    Assessment:  1. Abnormal EKG  2. Indeterminate troponin  3. Acute heart failure, as indicated on chest x-ray  4. ASCVD, status post remote history of stent placement, approximately 8-11 years ago  5. Paroxysmal atrial fibrillation, chronic anticoagulation with Eliquis, and rhythm control with Multaq  6. Essential hypertension  7. Dyslipidemia      Recommendations:  1. Medical records from both [facility] and [facility] have been requested.    2. With regard to the abnormal EKG, will await medical records and review for acute ischemic changes.  At this time it does seem unlikely that the changes are acute given the fact that the patient has not had recent chest pain.  3. Indeterminate troponins in the presence of pneumonia, COPD exacerbation and heart failure likely due to demand ischemia.  Pt can follow up with his primary cardiologist, Dr Gordillo after discharge.  4. Thank you for the consult.  We will review the medical records when available.      History of Present Illness    Subjective     Chief Complaint   Patient presents with   • Shortness of Breath       Alexandru Maxwell is a 73 y.o. male with past medical history significant for atrial fibrillation, chronically anticoagulated on Eliquis, coronary artery disease, one stent placed 8-11 years ago, hypertension, dyslipidemia, COPD and hypothyroidism.  He presented to the ED on 2/6/2019 with report of fever, cough and shortness of breath.  He had a recent admission at [facility] for pneumonia and stated he had not improved since discharge.  He was admitted for further evaluation and management.   "Cardiology was consulted for abnormal EKG and elevation of troponin to indeterminate level.    Mr. Maxwell states that at home he was having trouble breathing and felt like he was smothering.  He denies any recent chest pain, palpitations.      He has a known history of paroxysmal atrial fibrillation, chronically anticoagulated on Eliquis and rhythm control on Multaq.  He tells me that he recently had a procedure at [facility] during his last admission in which he describes as the doctor having gone \"through his groin to stop his heart from quivering.\"  He also reports that he had a stress test and echocardiogram in July 2018 at [facility], both were normal.      Cardiac risk factors:arteriosclerotic heart disease, hypercholesterolemia, hypertension and age and sex      Past Medical History:   Diagnosis Date   • Arthritis    • Atrial fibrillation (CMS/HCC)    • Cancer (CMS/HCC)    • COPD (chronic obstructive pulmonary disease) (CMS/HCC)    • Diabetes mellitus (CMS/HCC)    • Hyperlipidemia    • Hypertension    • Myocardial infarction (CMS/HCC)    • Pneumonia      Past Surgical History:   Procedure Laterality Date   • CARDIOVERSION     • CHOLECYSTECTOMY     • CORONARY STENT PLACEMENT       History reviewed. No pertinent family history.  Social History     Tobacco Use   • Smoking status: Former Smoker     Years: 45.00   Substance Use Topics   • Alcohol use: No     Frequency: Never   • Drug use: No     Medications Prior to Admission   Medication Sig Dispense Refill Last Dose   • apixaban (ELIQUIS) 5 MG tablet tablet Take 5 mg by mouth 2 (Two) Times a Day.   3/5/2019 at 1700   • aspirin 81 MG EC tablet Take 81 mg by mouth Daily.   3/5/2019 at 0900   • atorvastatin (LIPITOR) 20 MG tablet Take 20 mg by mouth Every Night.   3/4/2019 at Unknown time   • benzonatate (TESSALON) 100 MG capsule Take 100 mg by mouth 3 (Three) Times a Day As Needed for Cough.   3/5/2019 at 0900   • carvedilol (COREG) 6.25 MG tablet Take 6.25 mg " by mouth 2 (Two) Times a Day With Meals.   3/5/2019 at 0900   • clopidogrel (PLAVIX) 75 MG tablet Take 75 mg by mouth Daily.   3/5/2019 at 0900   • dronedarone (MULTAQ) 400 MG tablet Take 400 mg by mouth 2 (Two) Times a Day With Meals.   3/5/2019 at 0900   • hydrochlorothiazide (MICROZIDE) 12.5 MG capsule Take 25 mg by mouth Daily.   3/5/2019 at 0900   • levothyroxine (SYNTHROID, LEVOTHROID) 100 MCG tablet Take 100 mcg by mouth Daily.   3/5/2019 at 0900   • metoprolol succinate XL (TOPROL-XL) 50 MG 24 hr tablet Take 50 mg by mouth Daily.   3/5/2019 at 0900   • omeprazole (priLOSEC) 20 MG capsule Take 20 mg by mouth 2 (Two) Times a Day.   3/5/2019 at 0900   • promethazine (PHENERGAN) 12.5 MG tablet Take 12.5 mg by mouth Every 8 (Eight) Hours As Needed for Nausea or Vomiting.   3/5/2019 at 0900     Allergies:  Other    Review of Systems   Constitutional: Negative for fatigue.   Respiratory: Positive for cough and shortness of breath.    Cardiovascular: Negative for chest pain, palpitations and leg swelling.   Gastrointestinal: Negative for blood in stool.   Neurological: Negative for dizziness, syncope, weakness and light-headedness.   Hematological: Does not bruise/bleed easily.         Objective      Vital Signs  Temp:  [97 °F (36.1 °C)-98.8 °F (37.1 °C)] 98.1 °F (36.7 °C)  Heart Rate:  [] 95  Resp:  [16-38] 18  BP: (101-174)/(62-98) 141/79  Body mass index is 28.32 kg/m².    Intake/Output Summary (Last 24 hours) at 3/6/2019 1338  Last data filed at 3/6/2019 1217  Gross per 24 hour   Intake 460 ml   Output 200 ml   Net 260 ml       Physical Exam   Constitutional: He appears well-developed and well-nourished.   HENT:   Head: Normocephalic and atraumatic.   Eyes: Pupils are equal, round, and reactive to light.   Neck: No JVD present.   Cardiovascular: Normal rate, regular rhythm and intact distal pulses. Exam reveals no gallop and no friction rub.   No murmur heard.  Pulmonary/Chest: Effort normal. No  respiratory distress. He has wheezes. He has rhonchi. He has no rales.   Abdominal: Soft. He exhibits no mass. There is no tenderness. No hernia.   Skin: Skin is warm and dry.   Psychiatric: He has a normal mood and affect.   Vitals reviewed.      Telemetry:  Sinus 80s    Results Review:   I reviewed the patient's new clinical results.  Results from last 7 days   Lab Units 03/06/19  0925 03/06/19  0352 03/05/19  2145 03/05/19  1838   CK TOTAL U/L  --   --  74  --    TROPONIN I ng/mL 0.035 0.035 0.046* 0.058*   CKMB ng/mL  --   --  2.36  --      Results from last 7 days   Lab Units 03/06/19  0352 03/05/19  1839   WBC 10*3/mm3 6.15 6.98   HEMOGLOBIN g/dL 12.7* 13.5*   PLATELETS 10*3/mm3 257 303     Results from last 7 days   Lab Units 03/06/19  0352 03/05/19  1838   SODIUM mmol/L 136 138   POTASSIUM mmol/L 3.9 3.9   CHLORIDE mmol/L 105 102   CO2 mmol/L 21.6* 25.7   BUN mg/dL 15 18   CREATININE mg/dL 0.95 1.03   CALCIUM mg/dL 8.8 9.3   GLUCOSE mg/dL 148* 111*   ALT (SGPT) U/L  --  30   AST (SGOT) U/L  --  32     No results found for: INR  No results found for: MG  No results found for: TSH, PSA, CHLPL, TRIG, HDL, LDL   Lab Results   Component Value Date    .0 (H) 03/05/2019        EKG:         Imaging Results (last 72 hours)     Procedure Component Value Units Date/Time    XR Chest 1 View [[id number]] Collected:  03/06/19 0829     Updated:  03/06/19 0926    Narrative:       XR CHEST 1 VW-     HISTORY:  SOA Triage Protocol          COMPARISON: 07/07/2015      TECHNIQUE: Single frontal view of the chest.     FINDINGS:     There is no focal alveolar infiltrate or effusion.  The cardiac silhouette is normal. The pulmonary vasculature is  unremarkable.  Arthritic change in the shoulders. Left humeral head appears high in  relation to the glenoid. Recommend clinical correlation.     COPD.     There are no suspicious-appearing parenchymal soft tissue nodules.            Impression:       No evidence of active or acute  cardiopulmonary disease on today's chest  radiograph.         This report was finalized on 3/6/2019 9:24 AM by Dr. Med Silver MD.       CT Chest Pulmonary Embolism With Contrast [[id number]] Collected:  03/06/19 0803     Updated:  03/06/19 0807    Narrative:       EXAMINATION: CT CHEST PULMONARY EMBOLISM W CONTRAST-      Technique: Multiple CT axial images were obtained through the level of  pulmonary arteries, following IV contrast administration per CT PE  protocol.  Volume Rendered 3D or MIP images performed.     Radiation dose reduction techniques were utilized per ALARA protocol.  Automated exposure control was initiated through either or [employer] or  DoseRight software packages by  protocol.       238.7 mGy.cm        CLINICAL INDICATION:    SOB and Chest Pain     COMPARISON:    Chest x-ray performed on same day.     FINDINGS:     No pulmonary embolism identified.     Patchy opacities noted involving the right upper lobe and right middle  lobe lateral segment as well as the right lower lobe in a centrilobular  distribution most compatible with atypical pneumonia. Interstitial  thickening is present superimposed on chronic lung changes with  cardiomegaly and severe coronary artery calcifications, likely edema.  Mild centrilobular emphysema. No pleural or pericardial effusions. No  thoracic adenopathy by CT size criteria.     Bone windows demonstrate no acute osseous findings.     Partial imaging of upper abdomen is unremarkable.       Impression:          1. No PE.   2. CHF/edema.  3. Centrilobular airspace disease right lung most consistent with  atypical pneumonia. Follow-up CT after treatment recommended to confirm  resolution.  4. Other nonacute findings as above.     This report was finalized on 3/6/2019 8:04 AM by Dr. Pedro Hernandez MD.                Thank you very much for asking us to be involved in this patient's care.  We will follow along with you.    LORI Bower    03/06/19  1:38 PM

## 2019-03-06 NOTE — H&P
HISTORY AND PHYSICAL        Patient Identification:  Name:  Alexandru Maxwell  Age:  73 y.o.  Sex:  male  :  1945  MRN:  [id number]   Visit Number:  [id number]  Primary Care Physician:  Provider, No Known       Subjective     Subjective     Chief complaint:     Chief Complaint   Patient presents with   • Shortness of Breath       History of presenting illness:     Patient is a 73 year old male that presented to the ED with complaints of fever, cough, and shortness of breath. Patient was recently treated at [facility] for pneumonia, however he does not feel any better. Patient has a past medical history of A fib, COPD, Diabetes, Hypertension, Hyperlipidemia, MI. WBC normal. CRP improving at 2.02. Mycoplasma negative. Influenza negative. Legionella pending. Blood cultures show no growth at this time. Respiratory culture in progress. Chest x-ray reveals COPD. CT of chest reveals CHF/edema, airspace disease of right lung consistent with atypical pneumonia.       ---------------------------------------------------------------------------------------------------------------------     Review Of Systems:    Constitutional: no fever, chills and night sweats. No appetite change or unexpected weight change. No fatigue.  Eyes: no eye drainage, itching or redness.  HEENT: no mouth sores, dysphagia or nose bleed.  Respiratory: Positive shortness of breath, cough and production of sputum.  Cardiovascular: no chest pain, no palpitations, no orthopnea.  Gastrointestinal: no nausea, vomiting or diarrhea. No abdominal pain, hematemesis or rectal bleeding.  Genitourinary: no dysuria or polyuria.  Hematologic/lymphatic: no lymph node abnormalities, no easy bruising or easy bleeding.  Musculoskeletal: no muscle or joint pain.  Skin: No rash and no itching.  Neurological: no loss of consciousness, no seizure, no headache.  Behavioral/Psych: no depression or suicidal ideation.  Endocrine: no hot flashes.  Immunologic:  negative.    ---------------------------------------------------------------------------------------------------------------------     Past Medical History    Past Medical History:   Diagnosis Date   • Arthritis    • Atrial fibrillation (CMS/[facility])    • Cancer (CMS/[facility])    • COPD (chronic obstructive pulmonary disease) (CMS/[facility])    • Diabetes mellitus (CMS/[facility])    • Hyperlipidemia    • Hypertension    • Myocardial infarction (CMS/[facility])    • Pneumonia        Past Surgical History    Past Surgical History:   Procedure Laterality Date   • CARDIOVERSION     • CHOLECYSTECTOMY     • CORONARY STENT PLACEMENT         Family History    History reviewed. No pertinent family history.    Social History    Social History     Tobacco Use   • Smoking status: Former Smoker     Years: 45.00   Substance Use Topics   • Alcohol use: No     Frequency: Never   • Drug use: No       Allergies    Other  ---------------------------------------------------------------------------------------------------------------------     Home Medications:    Prior to Admission Medications     Prescriptions Last Dose Informant Patient Reported? Taking?    apixaban (ELIQUIS) 5 MG tablet tablet 3/5/2019 Medication Bottle Yes Yes    Take 5 mg by mouth 2 (Two) Times a Day.    aspirin 81 MG EC tablet 3/5/2019 Medication Bottle Yes Yes    Take 81 mg by mouth Daily.    atorvastatin (LIPITOR) 20 MG tablet 3/4/2019 Medication Bottle Yes Yes    Take 20 mg by mouth Every Night.    benzonatate (TESSALON) 100 MG capsule 3/5/2019 Medication Bottle Yes Yes    Take 100 mg by mouth 3 (Three) Times a Day As Needed for Cough.    carvedilol (COREG) 6.25 MG tablet 3/5/2019 Medication Bottle Yes Yes    Take 6.25 mg by mouth 2 (Two) Times a Day With Meals.    clopidogrel (PLAVIX) 75 MG tablet 3/5/2019 Medication Bottle Yes Yes    Take 75 mg by mouth Daily.    dronedarone (MULTAQ) 400 MG tablet 3/5/2019 Medication Bottle Yes Yes    Take 400 mg by mouth 2 (Two) Times a Day With  Meals.    hydrochlorothiazide (MICROZIDE) 12.5 MG capsule 3/5/2019 Medication Bottle Yes Yes    Take 25 mg by mouth Daily.    levothyroxine (SYNTHROID, LEVOTHROID) 100 MCG tablet 3/5/2019 Medication Bottle Yes Yes    Take 100 mcg by mouth Daily.    metoprolol succinate XL (TOPROL-XL) 50 MG 24 hr tablet 3/5/2019 Medication Bottle Yes Yes    Take 50 mg by mouth Daily.    omeprazole (priLOSEC) 20 MG capsule 3/5/2019  Yes Yes    Take 20 mg by mouth 2 (Two) Times a Day.    promethazine (PHENERGAN) 12.5 MG tablet 3/5/2019 Medication Bottle Yes Yes    Take 12.5 mg by mouth Every 8 (Eight) Hours As Needed for Nausea or Vomiting.        ---------------------------------------------------------------------------------------------------------------------    Objective     Objective     Hospital Scheduled Meds:    apixaban 5 mg Oral BID   aspirin 81 mg Oral Daily   atorvastatin 20 mg Oral Nightly   carvedilol 6.25 mg Oral BID With Meals   ceftriaxone 2 g Intravenous Q24H   clopidogrel 75 mg Oral Daily   doxycycline 100 mg Intravenous Q12H   dronedarone 400 mg Oral BID With Meals   furosemide 40 mg Intravenous Once   hydrochlorothiazide 25 mg Oral Daily   ipratropium-albuterol 3 mL Nebulization Q6H - RT   lactobacillus acidophilus 1 capsule Oral Daily   levothyroxine 100 mcg Oral Daily   methylPREDNISolone sodium succinate 60 mg Intravenous Q12H   metoprolol succinate XL 50 mg Oral Daily   pantoprazole 40 mg Oral QAM   sodium chloride 3 mL Intravenous Q12H        ---------------------------------------------------------------------------------------------------------------------   Vital Signs:  Temp:  [97 °F (36.1 °C)-98.8 °F (37.1 °C)] 98.1 °F (36.7 °C)  Heart Rate:  [] 95  Resp:  [16-38] 18  BP: (101-174)/(62-98) 141/79  Mean Arterial Pressure (Non-Invasive) for the past 24 hrs (Last 3 readings):   Noninvasive MAP (mmHg)   03/06/19 0653 121   03/06/19 0322 129   03/06/19 0016 119     SpO2 Percentage    03/06/19 0640  03/06/19 0653 03/06/19 1100   SpO2: 97% 99% 95%     SpO2:  [92 %-100 %] 95 %  on   ;   Device (Oxygen Therapy): room air    Body mass index is 28.32 kg/m².  Wt Readings from Last 3 Encounters:   03/05/19 74.8 kg (165 lb)     ---------------------------------------------------------------------------------------------------------------------     Physical Exam:    Constitutional:  Well-developed and well-nourished.  No respiratory distress.      HENT:  Head: Normocephalic and atraumatic.  Mouth:  Moist mucous membranes.    Eyes:  Conjunctivae and EOM are normal.  No scleral icterus.  Neck:  Neck supple.  No JVD present.    Cardiovascular:  Normal rate, regular rhythm and normal heart sounds with no murmur. No edema.  Pulmonary/Chest:  Bilateral expiratory wheezes.  Abdominal:  Soft.  Bowel sounds are normal.  No distension and no tenderness.   Musculoskeletal:  No edema, no tenderness, and no deformity.  No swelling or redness of joints.  Neurological:  Alert and oriented to person, place, and time.  No facial droop.  No slurred speech.   Skin:  Skin is warm and dry.  No rash noted.  No pallor.   Psychiatric:  Normal mood and affect.  Behavior is normal.    ---------------------------------------------------------------------------------------------------------------------  I have personally reviewed the EKG/Telemetry strip  ---------------------------------------------------------------------------------------------------------------------   Results from last 7 days   Lab Units 03/06/19  0925 03/06/19  0352 03/05/19  2145   CK TOTAL U/L  --   --  74   CKMB ng/mL  --   --  2.36   CK MB INDEX %  --   --  3.2*   TROPONIN I ng/mL 0.035 0.035 0.046*         Results from last 7 days   Lab Units 03/05/19  1844   PH, ARTERIAL pH units 7.482*   PO2 ART mm Hg 85.4   PCO2, ARTERIAL mm Hg 30.9*   HCO3 ART mmol/L 22.6     Results from last 7 days   Lab Units 03/06/19  0925 03/06/19  0352 03/05/19  9951 03/05/19  8139  03/05/19  1830   CRP mg/dL 2.02*  --  2.10*  --  2.88*   LACTATE mmol/L  --   --   --   --  1.5   WBC 10*3/mm3  --  6.15  --  6.98  --    HEMOGLOBIN g/dL  --  12.7*  --  13.5*  --    HEMATOCRIT %  --  40.1*  --  41.3*  --    MCV fL  --  95.5*  --  93.9  --    MCHC g/dL  --  31.7*  --  32.7*  --    PLATELETS 10*3/mm3  --  257  --  303  --      Results from last 7 days   Lab Units 03/06/19  0352 03/05/19  1838   SODIUM mmol/L 136 138   POTASSIUM mmol/L 3.9 3.9   CHLORIDE mmol/L 105 102   CO2 mmol/L 21.6* 25.7   BUN mg/dL 15 18   CREATININE mg/dL 0.95 1.03   EGFR IF NONAFRICN AM mL/min/1.73 77 70   CALCIUM mg/dL 8.8 9.3   GLUCOSE mg/dL 148* 111*   ALBUMIN g/dL  --  4.10   BILIRUBIN mg/dL  --  0.8   ALK PHOS U/L  --  110   AST (SGOT) U/L  --  32   ALT (SGPT) U/L  --  30   Estimated Creatinine Clearance: 62.1 mL/min (by C-G formula based on SCr of 0.95 mg/dL).  No results found for: AMMONIA    No results found for: HGBA1C, POCGLU  No results found for: HGBA1C  No results found for: TSH, FREET4    Blood Culture   Date Value Ref Range Status   03/05/2019 No growth at less than 24 hours  Preliminary   03/05/2019 No growth at less than 24 hours  Preliminary                 Pain Management Panel     There is no flowsheet data to display.        I have personally reviewed the above laboratory results.   ---------------------------------------------------------------------------------------------------------------------  Imaging Results (last 7 days)     Procedure Component Value Units Date/Time    XR Chest 1 View [[id number]] Collected:  03/06/19 0829     Updated:  03/06/19 0926    Narrative:       XR CHEST 1 VW-     HISTORY:  [facility]A Triage Protocol          COMPARISON: 07/07/2015      TECHNIQUE: Single frontal view of the chest.     FINDINGS:     There is no focal alveolar infiltrate or effusion.  The cardiac silhouette is normal. The pulmonary vasculature is  unremarkable.  Arthritic change in the shoulders. Left humeral head  appears high in  relation to the glenoid. Recommend clinical correlation.     COPD.     There are no suspicious-appearing parenchymal soft tissue nodules.            Impression:       No evidence of active or acute cardiopulmonary disease on today's chest  radiograph.         This report was finalized on 3/6/2019 9:24 AM by Dr. Med Silver MD.       CT Chest Pulmonary Embolism With Contrast [[id number]] Collected:  03/06/19 0803     Updated:  03/06/19 0807    Narrative:       EXAMINATION: CT CHEST PULMONARY EMBOLISM W CONTRAST-      Technique: Multiple CT axial images were obtained through the level of  pulmonary arteries, following IV contrast administration per CT PE  protocol.  Volume Rendered 3D or MIP images performed.     Radiation dose reduction techniques were utilized per ALARA protocol.  Automated exposure control was initiated through either or SHERP[employer] or  DoseRight software packages by  protocol.       238.7 mGy.cm        CLINICAL INDICATION:    SOB and Chest Pain     COMPARISON:    Chest x-ray performed on same day.     FINDINGS:     No pulmonary embolism identified.     Patchy opacities noted involving the right upper lobe and right middle  lobe lateral segment as well as the right lower lobe in a centrilobular  distribution most compatible with atypical pneumonia. Interstitial  thickening is present superimposed on chronic lung changes with  cardiomegaly and severe coronary artery calcifications, likely edema.  Mild centrilobular emphysema. No pleural or pericardial effusions. No  thoracic adenopathy by CT size criteria.     Bone windows demonstrate no acute osseous findings.     Partial imaging of upper abdomen is unremarkable.       Impression:          1. No PE.   2. CHF/edema.  3. Centrilobular airspace disease right lung most consistent with  atypical pneumonia. Follow-up CT after treatment recommended to confirm  resolution.  4. Other nonacute findings as above.     This report was  finalized on 3/6/2019 8:04 AM by Dr. Pedro Hernandez MD.           I have personally reviewed the above radiology results.   ---------------------------------------------------------------------------------------------------------------------      Assessment & Plan        Assessment/Plan       ASSESSMENT:    1. Pneumonia  2. CHF exacerbation  3. COPD    PLAN:    Patient is a 73 year old male that presented to the ED with complaints of fever, cough, and shortness of breath. Patient was recently treated at [facility] for pneumonia, however he does not feel any better. Patient has a past medical history of A fib, COPD, Diabetes, Hypertension, Hyperlipidemia, MI. WBC normal. CRP improving at 2.02. Mycoplasma negative. Influenza negative. Legionella pending. Blood cultures show no growth at this time. Respiratory culture in progress. Chest x-ray reveals COPD. CT of chest reveals CHF/edema, airspace disease of right lung consistent with atypical pneumonia.     Patient was admitted to the observation unit for further monitoring and evaluation. Telemetry and continuous pulse oximetry ordered. Supplemental oxygen therapy, IV steroids, nebs, Doxycycline, and Rocephin were ordered. Routine lab monitoring.    Cardiology was consulted in the setting of abnormal EKG with anteroseptal infarct noted and indeterminate Troponins.     Patient's findings and recommendations were discussed with patient and nursing staff      Code Status:   Code Status and Medical Interventions:   Ordered at: 03/05/19 2245     Code Status:    CPR     Medical Interventions (Level of Support Prior to Arrest):    Full           Kelin Maza, APRN  03/06/19  11:38 AM

## 2019-03-06 NOTE — ED NOTES
Called [facility] spoke with house supervisor Kourtney. Requested records from last visit/admit. Faxed request to [phone].      Symes, Heather  03/1945

## 2019-03-07 LAB
ANION GAP SERPL CALCULATED.3IONS-SCNC: 9.5 MMOL/L (ref 3.6–11.2)
BACTERIA SPEC RESP CULT: NORMAL
BASOPHILS # BLD AUTO: 0 10*3/MM3 (ref 0–0.3)
BASOPHILS NFR BLD AUTO: 0 % (ref 0–2)
BNP SERPL-MCNC: 145 PG/ML (ref 0–100)
BUN BLD-MCNC: 24 MG/DL (ref 7–21)
BUN/CREAT SERPL: 20.9 (ref 7–25)
CALCIUM SPEC-SCNC: 8.8 MG/DL (ref 7.7–10)
CHLORIDE SERPL-SCNC: 102 MMOL/L (ref 99–112)
CK MB SERPL-CCNC: 2.74 NG/ML (ref 0–5)
CK MB SERPL-RTO: 7 % (ref 0–3)
CK SERPL-CCNC: 39 U/L (ref 24–204)
CO2 SERPL-SCNC: 22.5 MMOL/L (ref 24.3–31.9)
CREAT BLD-MCNC: 1.15 MG/DL (ref 0.43–1.29)
CRP SERPL-MCNC: 1.35 MG/DL (ref 0–0.99)
DEPRECATED RDW RBC AUTO: 58.8 FL (ref 37–54)
EOSINOPHIL # BLD AUTO: 0 10*3/MM3 (ref 0–0.7)
EOSINOPHIL NFR BLD AUTO: 0 % (ref 0–7)
ERYTHROCYTE [DISTWIDTH] IN BLOOD BY AUTOMATED COUNT: 18.4 % (ref 11.5–14.5)
GFR SERPL CREATININE-BSD FRML MDRD: 62 ML/MIN/1.73
GLUCOSE BLD-MCNC: 205 MG/DL (ref 70–110)
GRAM STN SPEC: NORMAL
HCT VFR BLD AUTO: 37 % (ref 42–52)
HGB BLD-MCNC: 12.3 G/DL (ref 14–18)
IMM GRANULOCYTES # BLD AUTO: 0.03 10*3/MM3 (ref 0–0.03)
IMM GRANULOCYTES NFR BLD AUTO: 0.3 % (ref 0–0.5)
LYMPHOCYTES # BLD AUTO: 0.74 10*3/MM3 (ref 1–3)
LYMPHOCYTES NFR BLD AUTO: 6.6 % (ref 16–46)
MCH RBC QN AUTO: 31.1 PG (ref 27–33)
MCHC RBC AUTO-ENTMCNC: 33.2 G/DL (ref 33–37)
MCV RBC AUTO: 93.4 FL (ref 80–94)
MONOCYTES # BLD AUTO: 0.07 10*3/MM3 (ref 0.1–0.9)
MONOCYTES NFR BLD AUTO: 0.6 % (ref 0–12)
MYOGLOBIN SERPL-MCNC: 57 NG/ML (ref 0–109)
NEUTROPHILS # BLD AUTO: 10.34 10*3/MM3 (ref 1.4–6.5)
NEUTROPHILS NFR BLD AUTO: 92.5 % (ref 40–75)
OSMOLALITY SERPL CALC.SUM OF ELEC: 278.2 MOSM/KG (ref 273–305)
PLATELET # BLD AUTO: 280 10*3/MM3 (ref 130–400)
PMV BLD AUTO: 10.4 FL (ref 6–10)
POTASSIUM BLD-SCNC: 3.3 MMOL/L (ref 3.5–5.3)
RBC # BLD AUTO: 3.96 10*6/MM3 (ref 4.7–6.1)
SODIUM BLD-SCNC: 134 MMOL/L (ref 135–153)
TROPONIN I SERPL-MCNC: 0.03 NG/ML
TROPONIN I SERPL-MCNC: 0.03 NG/ML
TROPONIN I SERPL-MCNC: 0.04 NG/ML
TROPONIN I SERPL-MCNC: 0.04 NG/ML
WBC NRBC COR # BLD: 11.18 10*3/MM3 (ref 4.5–12.5)

## 2019-03-07 PROCEDURE — 82553 CREATINE MB FRACTION: CPT | Performed by: INTERNAL MEDICINE

## 2019-03-07 PROCEDURE — G0378 HOSPITAL OBSERVATION PER HR: HCPCS

## 2019-03-07 PROCEDURE — 25010000002 METHYLPREDNISOLONE PER 125 MG: Performed by: INTERNAL MEDICINE

## 2019-03-07 PROCEDURE — 93005 ELECTROCARDIOGRAM TRACING: CPT | Performed by: NURSE PRACTITIONER

## 2019-03-07 PROCEDURE — G0108 DIAB MANAGE TRN  PER INDIV: HCPCS

## 2019-03-07 PROCEDURE — 80048 BASIC METABOLIC PNL TOTAL CA: CPT | Performed by: NURSE PRACTITIONER

## 2019-03-07 PROCEDURE — 85025 COMPLETE CBC W/AUTO DIFF WBC: CPT | Performed by: NURSE PRACTITIONER

## 2019-03-07 PROCEDURE — 94799 UNLISTED PULMONARY SVC/PX: CPT

## 2019-03-07 PROCEDURE — 84484 ASSAY OF TROPONIN QUANT: CPT | Performed by: NURSE PRACTITIONER

## 2019-03-07 PROCEDURE — 93010 ELECTROCARDIOGRAM REPORT: CPT | Performed by: INTERNAL MEDICINE

## 2019-03-07 PROCEDURE — 25010000002 CEFTRIAXONE: Performed by: NURSE PRACTITIONER

## 2019-03-07 PROCEDURE — 83880 ASSAY OF NATRIURETIC PEPTIDE: CPT | Performed by: NURSE PRACTITIONER

## 2019-03-07 PROCEDURE — 83874 ASSAY OF MYOGLOBIN: CPT | Performed by: INTERNAL MEDICINE

## 2019-03-07 PROCEDURE — 86140 C-REACTIVE PROTEIN: CPT | Performed by: NURSE PRACTITIONER

## 2019-03-07 PROCEDURE — 96376 TX/PRO/DX INJ SAME DRUG ADON: CPT

## 2019-03-07 PROCEDURE — 96368 THER/DIAG CONCURRENT INF: CPT

## 2019-03-07 PROCEDURE — 82550 ASSAY OF CK (CPK): CPT | Performed by: INTERNAL MEDICINE

## 2019-03-07 PROCEDURE — 96366 THER/PROPH/DIAG IV INF ADDON: CPT

## 2019-03-07 PROCEDURE — 84484 ASSAY OF TROPONIN QUANT: CPT | Performed by: INTERNAL MEDICINE

## 2019-03-07 RX ORDER — POTASSIUM CHLORIDE 20 MEQ/1
40 TABLET, EXTENDED RELEASE ORAL EVERY 4 HOURS
Status: COMPLETED | OUTPATIENT
Start: 2019-03-07 | End: 2019-03-07

## 2019-03-07 RX ADMIN — PANTOPRAZOLE SODIUM 40 MG: 40 TABLET, DELAYED RELEASE ORAL at 05:14

## 2019-03-07 RX ADMIN — IPRATROPIUM BROMIDE AND ALBUTEROL SULFATE 3 ML: .5; 3 SOLUTION RESPIRATORY (INHALATION) at 18:56

## 2019-03-07 RX ADMIN — DOXYCYCLINE 100 MG: 100 INJECTION, POWDER, LYOPHILIZED, FOR SOLUTION INTRAVENOUS at 11:50

## 2019-03-07 RX ADMIN — CLOPIDOGREL 75 MG: 75 TABLET, FILM COATED ORAL at 09:07

## 2019-03-07 RX ADMIN — DOXYCYCLINE 100 MG: 100 INJECTION, POWDER, LYOPHILIZED, FOR SOLUTION INTRAVENOUS at 22:28

## 2019-03-07 RX ADMIN — METOPROLOL SUCCINATE 50 MG: 50 TABLET, FILM COATED, EXTENDED RELEASE ORAL at 09:02

## 2019-03-07 RX ADMIN — DRONEDARONE 400 MG: 400 TABLET, FILM COATED ORAL at 16:38

## 2019-03-07 RX ADMIN — SODIUM CHLORIDE, PRESERVATIVE FREE 3 ML: 5 INJECTION INTRAVENOUS at 09:07

## 2019-03-07 RX ADMIN — Medication 1 CAPSULE: at 09:03

## 2019-03-07 RX ADMIN — CEFTRIAXONE 2 G: 2 INJECTION, POWDER, FOR SOLUTION INTRAMUSCULAR; INTRAVENOUS at 22:28

## 2019-03-07 RX ADMIN — APIXABAN 5 MG: 5 TABLET, FILM COATED ORAL at 09:03

## 2019-03-07 RX ADMIN — CARVEDILOL 6.25 MG: 6.25 TABLET, FILM COATED ORAL at 09:03

## 2019-03-07 RX ADMIN — METHYLPREDNISOLONE SODIUM SUCCINATE 60 MG: 125 INJECTION, POWDER, FOR SOLUTION INTRAMUSCULAR; INTRAVENOUS at 20:18

## 2019-03-07 RX ADMIN — POTASSIUM CHLORIDE 40 MEQ: 1500 TABLET, EXTENDED RELEASE ORAL at 11:49

## 2019-03-07 RX ADMIN — METHYLPREDNISOLONE SODIUM SUCCINATE 60 MG: 125 INJECTION, POWDER, FOR SOLUTION INTRAMUSCULAR; INTRAVENOUS at 09:02

## 2019-03-07 RX ADMIN — POTASSIUM CHLORIDE 40 MEQ: 1500 TABLET, EXTENDED RELEASE ORAL at 09:02

## 2019-03-07 RX ADMIN — ASPIRIN 81 MG: 81 TABLET ORAL at 09:02

## 2019-03-07 RX ADMIN — HYDROCHLOROTHIAZIDE 25 MG: 12.5 CAPSULE, GELATIN COATED ORAL at 09:02

## 2019-03-07 RX ADMIN — APIXABAN 5 MG: 5 TABLET, FILM COATED ORAL at 20:18

## 2019-03-07 RX ADMIN — SODIUM CHLORIDE, PRESERVATIVE FREE 3 ML: 5 INJECTION INTRAVENOUS at 20:18

## 2019-03-07 RX ADMIN — CARVEDILOL 6.25 MG: 6.25 TABLET, FILM COATED ORAL at 16:38

## 2019-03-07 RX ADMIN — DRONEDARONE 400 MG: 400 TABLET, FILM COATED ORAL at 09:02

## 2019-03-07 RX ADMIN — ATORVASTATIN CALCIUM 20 MG: 20 TABLET, FILM COATED ORAL at 20:18

## 2019-03-07 RX ADMIN — LEVOTHYROXINE SODIUM 100 MCG: 100 TABLET ORAL at 09:03

## 2019-03-07 NOTE — PLAN OF CARE
Problem: Fall Risk (Adult)  Goal: Identify Related Risk Factors and Signs and Symptoms  Outcome: Ongoing (interventions implemented as appropriate)      Problem: Skin Injury Risk (Adult)  Goal: Identify Related Risk Factors and Signs and Symptoms  Outcome: Ongoing (interventions implemented as appropriate)      Problem: Patient Care Overview  Goal: Individualization and Mutuality  Outcome: Ongoing (interventions implemented as appropriate)    Goal: Interprofessional Rounds/Family Conf  Outcome: Ongoing (interventions implemented as appropriate)

## 2019-03-07 NOTE — CONSULTS
Pt resting comfortably in bed, alert and oriented. .  Counseled patient on diabetes basic handout which discusses exactly what diabetes is and how it affects the body.  Counseled patient on complications of hyperglycemia and ways to prevent it.  Counseled patient on hypoglycemia and treatment by using the rule of 15.  Counseled patient on the importance of checking blood glucose levels frequently and keeping a log to take to all MD appointments.  Counseled patient on how to care for themselves during sick day.  Stressed the importance of healthy eating with a limit of carbohydrates to 180 per day.  Counseled on importance of complying with medications as ordered by provider.  Counseled patient to seek medical attention if blood glucose above 300.  Patient verbalizes understanding of all information given. Encouraged patient to attend an outpatient diabetes smart class or attend diabetes support  group.  Left time and dates of classes with patient along with my name and contact information, will continue to monitor patient as needed.

## 2019-03-07 NOTE — PLAN OF CARE
Problem: Fall Risk (Adult)  Goal: Absence of Fall  Outcome: Ongoing (interventions implemented as appropriate)   03/06/19 1108   Fall Risk (Adult)   Absence of Fall making progress toward outcome       Problem: Skin Injury Risk (Adult)  Goal: Skin Health and Integrity  Outcome: Ongoing (interventions implemented as appropriate)   03/07/19 1450   Skin Injury Risk (Adult)   Skin Health and Integrity making progress toward outcome       Problem: Pneumonia (Adult)  Goal: Signs and Symptoms of Listed Potential Problems Will be Absent, Minimized or Managed (Pneumonia)  Outcome: Ongoing (interventions implemented as appropriate)   03/06/19 0011   Goal/Outcome Evaluation   Problems Assessed (Pneumonia) all   Problems Present (Pneumonia) respiratory compromise       Problem: Patient Care Overview  Goal: Plan of Care Review  Outcome: Ongoing (interventions implemented as appropriate)   03/07/19 0830   Coping/Psychosocial   Plan of Care Reviewed With patient

## 2019-03-07 NOTE — PROGRESS NOTES
PROGRESS NOTE         Patient Identification:  Name:  Alexandru Maxwell  Age:  73 y.o.  Sex:  male  :  1945  MRN:  [id number]  Visit Number:  [id number]  Primary Care Provider:  Provider, No Known      ----------------------------------------------------------------------------------------------------------------------  Subjective       Chief Complaints:    Shortness of Breath        Interval History:      The patient continues to cough but overall states he is feeling better. Troponin level trending down. Records from [facility] reviewed and patient did not complete a course of antibiotic therapy for pneumonia. BNP has improved at 145. CRP improving with a normal white count.     Review of Systems:      Constitutional: no fever, chills and night sweats. No appetite change or unexpected weight change. No fatigue.  Eyes: no eye drainage, itching or redness.  HEENT: no mouth sores, dysphagia or nose bleed.  Respiratory: Positive shortness of breath, cough and production of sputum.  Cardiovascular: no chest pain, no palpitations, no orthopnea.  Gastrointestinal: no nausea, vomiting or diarrhea. No abdominal pain, hematemesis or rectal bleeding.  Genitourinary: no dysuria or polyuria.  Hematologic/lymphatic: no lymph node abnormalities, no easy bruising or easy bleeding.  Musculoskeletal: no muscle or joint pain.  Skin: No rash and no itching.  Neurological: no loss of consciousness, no seizure, no headache.  Behavioral/Psych: no depression or suicidal ideation.  Endocrine: no hot flashes.  Immunologic: negative.        ----------------------------------------------------------------------------------------------------------------------      Objective       Current [facility] Meds:    apixaban 5 mg Oral BID   aspirin 81 mg Oral Daily   atorvastatin 20 mg Oral Nightly   carvedilol 6.25 mg Oral BID With Meals   ceftriaxone 2 g Intravenous Q24H   clopidogrel 75 mg Oral Daily   doxycycline 100 mg  Intravenous Q12H   dronedarone 400 mg Oral BID With Meals   hydrochlorothiazide 25 mg Oral Daily   lactobacillus acidophilus 1 capsule Oral Daily   levothyroxine 100 mcg Oral Daily   methylPREDNISolone sodium succinate 60 mg Intravenous Q12H   metoprolol succinate XL 50 mg Oral Daily   pantoprazole 40 mg Oral QAM   sodium chloride 3 mL Intravenous Q12H        ----------------------------------------------------------------------------------------------------------------------    Vital Signs:  Temp:  [97.4 °F (36.3 °C)-97.8 °F (36.6 °C)] 97.4 °F (36.3 °C)  Heart Rate:  [81-92] 81  Resp:  [18] 18  BP: (113-143)/(71-78) 143/74  Mean Arterial Pressure (Non-Invasive) for the past 24 hrs (Last 3 readings):   Noninvasive MAP (mmHg)   03/07/19 0559 92   03/07/19 0300 89   03/06/19 1840 88     SpO2 Percentage    03/07/19 0640 03/07/19 1047 03/07/19 1409   SpO2: 94% 96% 97%     SpO2:  [92 %-97 %] 97 %  on   ;   Device (Oxygen Therapy): room air    Body mass index is 28.44 kg/m².  Wt Readings from Last 3 Encounters:   03/07/19 75.2 kg (165 lb 11.2 oz)        Intake/Output Summary (Last 24 hours) at 3/7/2019 1606  Last data filed at 3/7/2019 0300  Gross per 24 hour   Intake 460 ml   Output 400 ml   Net 60 ml     Diet Regular; Cardiac, Consistent Carbohydrate  ----------------------------------------------------------------------------------------------------------------------    Physical exam:      Constitutional:  Well-developed and well-nourished.  No respiratory distress.      HENT:  Head: Normocephalic and atraumatic.  Mouth:  Moist mucous membranes.    Eyes:  Conjunctivae and EOM are normal.  No scleral icterus.  Neck:  Neck supple.  No JVD present.    Cardiovascular:  Normal rate, regular rhythm and normal heart sounds with no murmur. No edema.  Pulmonary/Chest:  Bilateral expiratory wheezes.  Abdominal:  Soft.  Bowel sounds are normal.  No distension and no tenderness.   Musculoskeletal:  No edema, no tenderness, and no  deformity.  No swelling or redness of joints.  Neurological:  Alert and oriented to person, place, and time.  No facial droop.  No slurred speech.   Skin:  Skin is warm and dry.  No rash noted.  No pallor.   Psychiatric:  Normal mood and affect.  Behavior is normal.    ----------------------------------------------------------------------------------------------------------------------  I have personally reviewed the EKG/Telemetry strips   ----------------------------------------------------------------------------------------------------------------------  Results from last 7 days   Lab Units 03/07/19  1142 03/07/19  0644 03/07/19 0059 03/05/19  2145   CK TOTAL U/L  --  39  --   --  74   CKMB ng/mL  --  2.74  --   --  2.36   CK MB INDEX %  --  7.0*  --   --  3.2*   TROPONIN I ng/mL 0.038 0.036 0.032   < > 0.046*   MYOGLOBIN ng/mL  --  57.0  --   --   --     < > = values in this interval not displayed.         Results from last 7 days   Lab Units 03/05/19  1844   PH, ARTERIAL pH units 7.482*   PO2 ART mm Hg 85.4   PCO2, ARTERIAL mm Hg 30.9*   HCO3 ART mmol/L 22.6     Results from last 7 days   Lab Units 03/07/19 0059 03/06/19  0925 03/06/19  0352 03/05/19  2241 03/05/19  1839 03/05/19  1830   CRP mg/dL 1.35* 2.02*  --  2.10*  --  2.88*   LACTATE mmol/L  --   --   --   --   --  1.5   WBC 10*3/mm3 11.18  --  6.15  --  6.98  --    HEMOGLOBIN g/dL 12.3*  --  12.7*  --  13.5*  --    HEMATOCRIT % 37.0*  --  40.1*  --  41.3*  --    MCV fL 93.4  --  95.5*  --  93.9  --    MCHC g/dL 33.2  --  31.7*  --  32.7*  --    PLATELETS 10*3/mm3 280  --  257  --  303  --      Results from last 7 days   Lab Units 03/07/19  0059 03/06/19  0352 03/05/19  1838   SODIUM mmol/L 134* 136 138   POTASSIUM mmol/L 3.3* 3.9 3.9   CHLORIDE mmol/L 102 105 102   CO2 mmol/L 22.5* 21.6* 25.7   BUN mg/dL 24* 15 18   CREATININE mg/dL 1.15 0.95 1.03   EGFR IF NONAFRICN AM mL/min/1.73 62 77 70   CALCIUM mg/dL 8.8 8.8 9.3   GLUCOSE mg/dL 205* 148* 111*    ALBUMIN g/dL  --   --  4.10   BILIRUBIN mg/dL  --   --  0.8   ALK PHOS U/L  --   --  110   AST (SGOT) U/L  --   --  32   ALT (SGPT) U/L  --   --  30   Estimated Creatinine Clearance: 51.5 mL/min (by C-G formula based on SCr of 1.15 mg/dL).  No results found for: AMMONIA    No results found for: HGBA1C, POCGLU  No results found for: HGBA1C  No results found for: TSH, FREET4    Blood Culture   Date Value Ref Range Status   03/05/2019 No growth at 24 hours  Preliminary   03/05/2019 Abnormal Stain (A)  Preliminary              Respiratory Culture   Date Value Ref Range Status   03/05/2019 Heavy growth (4+) Normal Respiratory Paula  Final     Pain Management Panel     There is no flowsheet data to display.          I have personally reviewed the above laboratory results.   ----------------------------------------------------------------------------------------------------------------------  Imaging Results (last 24 hours)     ** No results found for the last 24 hours. **        I have personally reviewed the above radiology results.   ----------------------------------------------------------------------------------------------------------------------    Assessment/Plan       Assessment/Plan     ASSESSMENT:      1. Pneumonia  2. CHF exacerbation  3. COPD     PLAN:    The patient continues to cough but overall states he is feeling better. Troponin level trending down. Records from [facility] reviewed and patient did not complete a course of antibiotic therapy for pneumonia. Will await cardiology recommendations. BNP has improved at 145. CRP improving with a normal white count. Recommend to continue Rocephin and Doxycycline for now. Will repeat CBC, CRP, CMP in am as well as chest xray.     Code Status:   Code Status and Medical Interventions:   Ordered at: 03/05/19 0368     Code Status:    CPR     Medical Interventions (Level of Support Prior to Arrest):    Full       Nancy Escobar PA-C  03/07/19  4:06  PM

## 2019-03-08 ENCOUNTER — APPOINTMENT (OUTPATIENT)
Dept: GENERAL RADIOLOGY | Facility: HOSPITAL | Age: 74
End: 2019-03-08

## 2019-03-08 VITALS
RESPIRATION RATE: 18 BRPM | DIASTOLIC BLOOD PRESSURE: 76 MMHG | BODY MASS INDEX: 28.31 KG/M2 | HEIGHT: 64 IN | TEMPERATURE: 97.4 F | HEART RATE: 80 BPM | WEIGHT: 165.8 LBS | SYSTOLIC BLOOD PRESSURE: 137 MMHG | OXYGEN SATURATION: 93 %

## 2019-03-08 LAB
ALBUMIN SERPL-MCNC: 3.2 G/DL (ref 3.4–4.8)
ALBUMIN/GLOB SERPL: 1 G/DL (ref 1.5–2.5)
ALP SERPL-CCNC: 82 U/L (ref 40–129)
ALT SERPL W P-5'-P-CCNC: 26 U/L (ref 10–44)
ANION GAP SERPL CALCULATED.3IONS-SCNC: 10.4 MMOL/L (ref 3.6–11.2)
AST SERPL-CCNC: 22 U/L (ref 10–34)
BASOPHILS # BLD AUTO: 0 10*3/MM3 (ref 0–0.3)
BASOPHILS NFR BLD AUTO: 0 % (ref 0–2)
BILIRUB SERPL-MCNC: 0.3 MG/DL (ref 0.2–1.8)
BUN BLD-MCNC: 26 MG/DL (ref 7–21)
BUN/CREAT SERPL: 26.5 (ref 7–25)
CALCIUM SPEC-SCNC: 8.9 MG/DL (ref 7.7–10)
CHLORIDE SERPL-SCNC: 106 MMOL/L (ref 99–112)
CO2 SERPL-SCNC: 20.6 MMOL/L (ref 24.3–31.9)
CREAT BLD-MCNC: 0.98 MG/DL (ref 0.43–1.29)
CRP SERPL-MCNC: 0.61 MG/DL (ref 0–0.99)
DEPRECATED RDW RBC AUTO: 62.4 FL (ref 37–54)
EOSINOPHIL # BLD AUTO: 0 10*3/MM3 (ref 0–0.7)
EOSINOPHIL NFR BLD AUTO: 0 % (ref 0–7)
ERYTHROCYTE [DISTWIDTH] IN BLOOD BY AUTOMATED COUNT: 18.7 % (ref 11.5–14.5)
GFR SERPL CREATININE-BSD FRML MDRD: 75 ML/MIN/1.73
GLOBULIN UR ELPH-MCNC: 3.1 GM/DL
GLUCOSE BLD-MCNC: 160 MG/DL (ref 70–110)
HCT VFR BLD AUTO: 37.8 % (ref 42–52)
HGB BLD-MCNC: 12.9 G/DL (ref 14–18)
IMM GRANULOCYTES # BLD AUTO: 0.04 10*3/MM3 (ref 0–0.03)
IMM GRANULOCYTES NFR BLD AUTO: 0.2 % (ref 0–0.5)
LYMPHOCYTES # BLD AUTO: 0.8 10*3/MM3 (ref 1–3)
LYMPHOCYTES NFR BLD AUTO: 4.9 % (ref 16–46)
MCH RBC QN AUTO: 32.1 PG (ref 27–33)
MCHC RBC AUTO-ENTMCNC: 34.1 G/DL (ref 33–37)
MCV RBC AUTO: 94 FL (ref 80–94)
MONOCYTES # BLD AUTO: 0.31 10*3/MM3 (ref 0.1–0.9)
MONOCYTES NFR BLD AUTO: 1.9 % (ref 0–12)
NEUTROPHILS # BLD AUTO: 15.09 10*3/MM3 (ref 1.4–6.5)
NEUTROPHILS NFR BLD AUTO: 93 % (ref 40–75)
OSMOLALITY SERPL CALC.SUM OF ELEC: 282 MOSM/KG (ref 273–305)
PLATELET # BLD AUTO: 273 10*3/MM3 (ref 130–400)
PMV BLD AUTO: 10.9 FL (ref 6–10)
POTASSIUM BLD-SCNC: 3.8 MMOL/L (ref 3.5–5.3)
PROT SERPL-MCNC: 6.3 G/DL (ref 6–8)
RBC # BLD AUTO: 4.02 10*6/MM3 (ref 4.7–6.1)
SODIUM BLD-SCNC: 137 MMOL/L (ref 135–153)
WBC NRBC COR # BLD: 16.24 10*3/MM3 (ref 4.5–12.5)

## 2019-03-08 PROCEDURE — 94799 UNLISTED PULMONARY SVC/PX: CPT

## 2019-03-08 PROCEDURE — 96376 TX/PRO/DX INJ SAME DRUG ADON: CPT

## 2019-03-08 PROCEDURE — 85025 COMPLETE CBC W/AUTO DIFF WBC: CPT | Performed by: PHYSICIAN ASSISTANT

## 2019-03-08 PROCEDURE — 71045 X-RAY EXAM CHEST 1 VIEW: CPT | Performed by: RADIOLOGY

## 2019-03-08 PROCEDURE — 80053 COMPREHEN METABOLIC PANEL: CPT | Performed by: PHYSICIAN ASSISTANT

## 2019-03-08 PROCEDURE — 71045 X-RAY EXAM CHEST 1 VIEW: CPT

## 2019-03-08 PROCEDURE — 96366 THER/PROPH/DIAG IV INF ADDON: CPT

## 2019-03-08 PROCEDURE — G0378 HOSPITAL OBSERVATION PER HR: HCPCS

## 2019-03-08 PROCEDURE — 25010000002 METHYLPREDNISOLONE PER 40 MG: Performed by: INTERNAL MEDICINE

## 2019-03-08 PROCEDURE — 86140 C-REACTIVE PROTEIN: CPT | Performed by: PHYSICIAN ASSISTANT

## 2019-03-08 RX ORDER — DOXYCYCLINE HYCLATE 100 MG
100 TABLET ORAL EVERY 12 HOURS
Qty: 6 TABLET | Refills: 0 | Status: SHIPPED | OUTPATIENT
Start: 2019-03-08 | End: 2019-03-11

## 2019-03-08 RX ORDER — CEFDINIR 300 MG/1
300 CAPSULE ORAL 2 TIMES DAILY
Qty: 6 CAPSULE | Refills: 0 | Status: SHIPPED | OUTPATIENT
Start: 2019-03-08 | End: 2019-03-11

## 2019-03-08 RX ADMIN — CARVEDILOL 6.25 MG: 6.25 TABLET, FILM COATED ORAL at 09:15

## 2019-03-08 RX ADMIN — METOPROLOL SUCCINATE 50 MG: 50 TABLET, FILM COATED, EXTENDED RELEASE ORAL at 09:16

## 2019-03-08 RX ADMIN — LEVOTHYROXINE SODIUM 100 MCG: 100 TABLET ORAL at 09:16

## 2019-03-08 RX ADMIN — APIXABAN 5 MG: 5 TABLET, FILM COATED ORAL at 09:16

## 2019-03-08 RX ADMIN — CLOPIDOGREL 75 MG: 75 TABLET, FILM COATED ORAL at 09:16

## 2019-03-08 RX ADMIN — IPRATROPIUM BROMIDE AND ALBUTEROL SULFATE 3 ML: .5; 3 SOLUTION RESPIRATORY (INHALATION) at 07:13

## 2019-03-08 RX ADMIN — DRONEDARONE 400 MG: 400 TABLET, FILM COATED ORAL at 09:16

## 2019-03-08 RX ADMIN — Medication 1 CAPSULE: at 09:16

## 2019-03-08 RX ADMIN — METHYLPREDNISOLONE SODIUM SUCCINATE 60 MG: 125 INJECTION, POWDER, FOR SOLUTION INTRAMUSCULAR; INTRAVENOUS at 09:19

## 2019-03-08 RX ADMIN — SODIUM CHLORIDE, PRESERVATIVE FREE 3 ML: 5 INJECTION INTRAVENOUS at 09:17

## 2019-03-08 RX ADMIN — ASPIRIN 81 MG: 81 TABLET ORAL at 09:16

## 2019-03-08 RX ADMIN — HYDROCHLOROTHIAZIDE 25 MG: 12.5 CAPSULE, GELATIN COATED ORAL at 09:16

## 2019-03-08 RX ADMIN — DOXYCYCLINE 100 MG: 100 INJECTION, POWDER, LYOPHILIZED, FOR SOLUTION INTRAVENOUS at 09:17

## 2019-03-08 RX ADMIN — PANTOPRAZOLE SODIUM 40 MG: 40 TABLET, DELAYED RELEASE ORAL at 05:16

## 2019-03-08 NOTE — PLAN OF CARE
Problem: Fall Risk (Adult)  Goal: Identify Related Risk Factors and Signs and Symptoms   03/06/19 0011   Fall Risk (Adult)   Related Risk Factors (Fall Risk) environment unfamiliar   Signs and Symptoms (Fall Risk) presence of risk factors     Goal: Absence of Fall   03/06/19 1108   Fall Risk (Adult)   Absence of Fall making progress toward outcome       Problem: Skin Injury Risk (Adult)  Goal: Identify Related Risk Factors and Signs and Symptoms  Outcome: Ongoing (interventions implemented as appropriate)    Goal: Skin Health and Integrity   03/07/19 1450   Skin Injury Risk (Adult)   Skin Health and Integrity making progress toward outcome       Problem: Pneumonia (Adult)  Goal: Signs and Symptoms of Listed Potential Problems Will be Absent, Minimized or Managed (Pneumonia)   03/06/19 0011   Goal/Outcome Evaluation   Problems Assessed (Pneumonia) all   Problems Present (Pneumonia) respiratory compromise       Problem: Patient Care Overview  Goal: Plan of Care Review   03/07/19 2030   Coping/Psychosocial   Plan of Care Reviewed With patient     Goal: Individualization and Mutuality  Outcome: Ongoing (interventions implemented as appropriate)    Goal: Discharge Needs Assessment   03/06/19 0013 03/06/19 1140   Discharge Needs Assessment   Patient/Family Anticipates Transition to --  home   Patient/Family Anticipated Services at Transition none --    Transportation Anticipated --  family or friend will provide   Equipment Needed After Discharge --  none   Disability   Equipment Currently Used at Home --  none     Goal: Interprofessional Rounds/Family Conf   03/08/19 0014   Interdisciplinary Rounds/Family Conf   Participants patient

## 2019-03-08 NOTE — DISCHARGE SUMMARY
DISCHARGE SUMMARY        Patient Identification:  Name:  Alexandru Maxwell  Age:  73 y.o.  Sex:  male  :  1945  MRN:  [id number]  Visit Number:  [id number]    Date of Admission: 3/5/2019  Date of Discharge:  3/8/19    PCP: Provider, No Known    Discharging Provider: Nancy Escobar PA-C      Discharge Diagnoses     Pneumonia      Consults/Procedures     Consults:   Consults     Date and Time Order Name Status Description    3/6/2019 1828 Inpatient Infectious Diseases Consult Completed     3/6/2019 1151 Inpatient Cardiology Consult Completed     3/5/2019 2242 IP General Consult (Use specialty-specific consult if known)            Procedures Performed:         History of Presenting Illness     Patient is a 73 year old male that presented to the ED with complaints of fever, cough, and shortness of breath. Patient was recently treated at [facility] for pneumonia, however he does not feel any better. Patient has a past medical history of A fib, COPD, Diabetes, Hypertension, Hyperlipidemia, MI. WBC normal. CRP improving at 2.02. Mycoplasma negative. Influenza negative. Legionella pending. Blood cultures show no growth at this time. Respiratory culture in progress. Chest x-ray reveals COPD. CT of chest reveals CHF/edema, airspace disease of right lung consistent with atypical pneumonia.     Hospital Course     Patient was admitted to the observation unit for further monitoring and evaluation. Telemetry and continuous pulse oximetry ordered. Supplemental oxygen therapy, IV steroids, nebs, Doxycycline, and Rocephin were ordered. Routine lab monitoring.     Cardiology was consulted in the setting of abnormal EKG with anteroseptal infarct noted and indeterminate Troponins. Tayler Wilson states at this time it does seem unlikely that the changes are acute given the fact that the patient has not had recent chest pain. Indeterminate troponins in the presence of pneumonia, COPD exacerbation and heart failure  likely due to demand ischemia.  Pt can follow up with his primary cardiologist, Dr Gordillo after discharge.    Records reviewed from [facility].     The patients cough and respiratory status has improved with normalized CRP level and mild leukocytosis most likely related to steroid therapy. Case discussed with Dr. Elliott who recommends the patient follow up with his cardiologist as outpatient as he is not having any chest pain and troponin level is now normal.     One positive blood out of two with BCID showing staph not aureus that is most likely a contaminant.    Would recommend Omnicef 300 mg PO BID and Doxycycline 100 mg PO BID both to continue through 3/11/19. Would recommend follow up with PCP within one week.         Discharge Vitals/Physical Examination     Vital Signs:  Temp:  [97.3 °F (36.3 °C)-97.8 °F (36.6 °C)] 97.4 °F (36.3 °C)  Heart Rate:  [75-81] 78  Resp:  [18] 18  BP: (117-143)/(68-78) 130/70  Mean Arterial Pressure (Non-Invasive) for the past 24 hrs (Last 3 readings):   Noninvasive MAP (mmHg)   03/08/19 0615 96   03/08/19 0326 91   03/07/19 1831 85     SpO2 Percentage    03/08/19 0615 03/08/19 0713 03/08/19 0722   SpO2: 95% 95% 95%     SpO2:  [92 %-98 %] 95 %  on   ;   Device (Oxygen Therapy): room air    Body mass index is 28.46 kg/m².  Wt Readings from Last 3 Encounters:   03/08/19 75.2 kg (165 lb 12.8 oz)         Physical Exam:    Constitutional:  Well-developed and well-nourished.  No respiratory distress.      HENT:  Head: Normocephalic and atraumatic.  Mouth:  Moist mucous membranes.    Eyes:  Conjunctivae and EOM are normal.  No scleral icterus.  Neck:  Neck supple.  No JVD present.    Cardiovascular:  Normal rate, regular rhythm and normal heart sounds with no murmur. No edema.  Pulmonary/Chest:  No respiratory distress, no wheezes, no crackles, with normal breath sounds and good air movement.  Abdominal:  Soft.  Bowel sounds are normal.  No distension and no tenderness.    Musculoskeletal:  No edema, no tenderness, and no deformity.  No swelling or redness of joints.  Neurological:  Alert and oriented to person, place, and time.  No facial droop.  No slurred speech.   Skin:  Skin is warm and dry.  No rash noted.  No pallor.   Psychiatric:  Normal mood and affect.  Behavior is normal.      Pertinent Laboratory/Radiology Results     Pertinent Laboratory Results:    Results from last 7 days   Lab Units 03/07/19  1834 03/07/19  1142 03/07/19  0644  03/05/19  2145   CK TOTAL U/L  --   --  39  --  74   CKMB ng/mL  --   --  2.74  --  2.36   CK MB INDEX %  --   --  7.0*  --  3.2*   TROPONIN I ng/mL 0.032 0.038 0.036   < > 0.046*   MYOGLOBIN ng/mL  --   --  57.0  --   --     < > = values in this interval not displayed.         Results from last 7 days   Lab Units 03/05/19  1844   PH, ARTERIAL pH units 7.482*   PO2 ART mm Hg 85.4   PCO2, ARTERIAL mm Hg 30.9*   HCO3 ART mmol/L 22.6     Results from last 7 days   Lab Units 03/08/19 0338 03/07/19  0059 03/06/19  0925 03/06/19  0352 03/05/19  1830   CRP mg/dL 0.61 1.35* 2.02*  --    < > 2.88*   LACTATE mmol/L  --   --   --   --   --  1.5   WBC 10*3/mm3 16.24* 11.18  --  6.15   < >  --    HEMOGLOBIN g/dL 12.9* 12.3*  --  12.7*   < >  --    HEMATOCRIT % 37.8* 37.0*  --  40.1*   < >  --    MCV fL 94.0 93.4  --  95.5*   < >  --    MCHC g/dL 34.1 33.2  --  31.7*   < >  --    PLATELETS 10*3/mm3 273 280  --  257   < >  --     < > = values in this interval not displayed.     Results from last 7 days   Lab Units 03/08/19 0338 03/07/19  0059 03/06/19  0352 03/05/19  1838   SODIUM mmol/L 137 134* 136 138   POTASSIUM mmol/L 3.8 3.3* 3.9 3.9   CHLORIDE mmol/L 106 102 105 102   CO2 mmol/L 20.6* 22.5* 21.6* 25.7   BUN mg/dL 26* 24* 15 18   CREATININE mg/dL 0.98 1.15 0.95 1.03   EGFR IF NONAFRICN AM mL/min/1.73 75 62 77 70   CALCIUM mg/dL 8.9 8.8 8.8 9.3   GLUCOSE mg/dL 160* 205* 148* 111*   ALBUMIN g/dL 3.20*  --   --  4.10   BILIRUBIN mg/dL 0.3  --   --   0.8   ALK PHOS U/L 82  --   --  110   AST (SGOT) U/L 22  --   --  32   ALT (SGPT) U/L 26  --   --  30   Estimated Creatinine Clearance: 60.4 mL/min (by C-G formula based on SCr of 0.98 mg/dL).  No results found for: AMMONIA    No results found for: HGBA1C, POCGLU  No results found for: HGBA1C  No results found for: TSH, FREET4    Blood Culture   Date Value Ref Range Status   03/05/2019 No growth at 2 days  Preliminary   03/05/2019 Gram positive cocci, consistent with Staph spp (A)  Preliminary              Respiratory Culture   Date Value Ref Range Status   03/05/2019 Heavy growth (4+) Normal Respiratory Paula  Final     Pain Management Panel     There is no flowsheet data to display.          Pertinent Radiology Results:  Imaging Results (all)     Procedure Component Value Units Date/Time    XR Chest 1 View [[id number]] Collected:  03/08/19 1036     Updated:  03/08/19 1039    Narrative:       EXAMINATION: XR CHEST 1 VW-      CLINICAL INDICATION:     cough; J18.9-Pneumonia, unspecified organism     TECHNIQUE:  XR CHEST 1 VW-      COMPARISON: 3/5/2019      FINDINGS:   Stable emphysema changes. Focal areas of scarring or fibrosis right  midlung are stable.  Lungs are aerated.   Heart size, mediastinum, and pulmonary vascularity are unremarkable.   No pneumothorax.   No effusions.   No acute osseous findings.            Impression:       Stable chest. No acute changes.     This report was finalized on 3/8/2019 10:36 AM by Dr. Pedro Hernandez MD.       XR Chest 1 View [[id number]] Collected:  03/06/19 0829     Updated:  03/06/19 0926    Narrative:       XR CHEST 1 VW-     HISTORY:  [facility] Triage Protocol          COMPARISON: 07/07/2015      TECHNIQUE: Single frontal view of the chest.     FINDINGS:     There is no focal alveolar infiltrate or effusion.  The cardiac silhouette is normal. The pulmonary vasculature is  unremarkable.  Arthritic change in the shoulders. Left humeral head appears high in  relation to the glenoid.  Recommend clinical correlation.     COPD.     There are no suspicious-appearing parenchymal soft tissue nodules.            Impression:       No evidence of active or acute cardiopulmonary disease on today's chest  radiograph.         This report was finalized on 3/6/2019 9:24 AM by Dr. Med Silver MD.       CT Chest Pulmonary Embolism With Contrast [[id number]] Collected:  03/06/19 0803     Updated:  03/06/19 0807    Narrative:       EXAMINATION: CT CHEST PULMONARY EMBOLISM W CONTRAST-      Technique: Multiple CT axial images were obtained through the level of  pulmonary arteries, following IV contrast administration per CT PE  protocol.  Volume Rendered 3D or MIP images performed.     Radiation dose reduction techniques were utilized per ALARA protocol.  Automated exposure control was initiated through either or Whatser or  DoseRight software packages by  protocol.       238.7 mGy.cm        CLINICAL INDICATION:    SOB and Chest Pain     COMPARISON:    Chest x-ray performed on same day.     FINDINGS:     No pulmonary embolism identified.     Patchy opacities noted involving the right upper lobe and right middle  lobe lateral segment as well as the right lower lobe in a centrilobular  distribution most compatible with atypical pneumonia. Interstitial  thickening is present superimposed on chronic lung changes with  cardiomegaly and severe coronary artery calcifications, likely edema.  Mild centrilobular emphysema. No pleural or pericardial effusions. No  thoracic adenopathy by CT size criteria.     Bone windows demonstrate no acute osseous findings.     Partial imaging of upper abdomen is unremarkable.       Impression:          1. No PE.   2. CHF/edema.  3. Centrilobular airspace disease right lung most consistent with  atypical pneumonia. Follow-up CT after treatment recommended to confirm  resolution.  4. Other nonacute findings as above.     This report was finalized on 3/6/2019 8:04 AM by   Pedro Hernandez MD.             Test Results Pending at Discharge:   Order Current Status    Blood Culture - Blood, Arm, Left Preliminary result    Blood Culture - Blood, Arm, Right Preliminary result          Discharge Disposition/Discharge Medications/Discharge Appointments     Discharge Disposition:   Home or Self Care    Condition at Discharge:  Stable, much improved with no issues today     Code Status While Inpatient:  Code Status and Medical Interventions:   Ordered at: 03/05/19 0182     Code Status:    CPR     Medical Interventions (Level of Support Prior to Arrest):    Full       Discharge Medications:     Discharge Medications      New Medications      Instructions Start Date   cefdinir 300 MG capsule  Commonly known as:  OMNICEF   300 mg, Oral, 2 Times Daily      doxycycline 100 MG tablet  Commonly known as:  VIBRAMYICN   100 mg, Oral, Every 12 Hours         Continue These Medications      Instructions Start Date   apixaban 5 MG tablet tablet  Commonly known as:  ELIQUIS   5 mg, Oral, 2 Times Daily      aspirin 81 MG EC tablet   81 mg, Oral, Daily      atorvastatin 20 MG tablet  Commonly known as:  LIPITOR   20 mg, Oral, Nightly      benzonatate 100 MG capsule  Commonly known as:  TESSALON   100 mg, Oral, 3 Times Daily PRN      carvedilol 6.25 MG tablet  Commonly known as:  COREG   6.25 mg, Oral, 2 Times Daily With Meals      clopidogrel 75 MG tablet  Commonly known as:  PLAVIX   75 mg, Oral, Daily      dronedarone 400 MG tablet  Commonly known as:  MULTAQ   400 mg, Oral, 2 Times Daily With Meals      hydrochlorothiazide 12.5 MG capsule  Commonly known as:  MICROZIDE   25 mg, Oral, Daily      levothyroxine 100 MCG tablet  Commonly known as:  SYNTHROID, LEVOTHROID   100 mcg, Oral, Daily      metoprolol succinate XL 50 MG 24 hr tablet  Commonly known as:  TOPROL-XL   50 mg, Oral, Daily      omeprazole 20 MG capsule  Commonly known as:  priLOSEC   20 mg, Oral, 2 Times Daily      promethazine 12.5 MG  tablet  Commonly known as:  PHENERGAN   12.5 mg, Oral, Every 8 Hours PRN             Discharge Diet:      Discharge Activity:      Discharge Appointments:      Follow-up Information     Provider, No Known. Schedule an appointment as soon as possible for a visit in 1 week(s).    Contact information:  [facility] [address]             Gaston Gordillo MD. Schedule an appointment as soon as possible for a visit in 1 week(s).    Specialty:  Cardiac Electrophysiology  Contact information:  [address] [address] [phone]                           Nancy Escobar PA-C  03/08/19  11:07 AM          Please note that this discharge summary required more than 30 minutes to complete.

## 2019-03-08 NOTE — PROGRESS NOTES
Discharge Planning Assessment   [facility]     Patient Name: Alexandru Maxwell  MRN: [id number]  Today's Date: 3/8/2019    Admit Date: 3/5/2019        Discharge Plan     Row Name 03/08/19 1111       Plan    Final Discharge Disposition Code  01 - home or self-care    Final Note  Pt to be discharged home on this date.          Kaley Dillon

## 2019-03-08 NOTE — PROGRESS NOTES
Antibiotic length of therapy : consider switch to oral soon.      Rocephin  Day 4    Doxycycline   Day 4

## 2019-03-08 NOTE — DISCHARGE INSTR - APPOINTMENTS
PT  HAS  AN  APT  WITH  [facility]  MARCH 18   [facility]  10  ; 40  AND  DR FERNANDES  FOR  MARCH 14  Y  11:15

## 2019-03-09 ENCOUNTER — READMISSION MANAGEMENT (OUTPATIENT)
Dept: CALL CENTER | Facility: HOSPITAL | Age: 74
End: 2019-03-09

## 2019-03-09 LAB
BACTERIA SPEC AEROBE CULT: ABNORMAL
GRAM STN SPEC: ABNORMAL
ISOLATED FROM: ABNORMAL

## 2019-03-09 NOTE — OUTREACH NOTE
Prep Survey      Responses   Facility patient discharged from?  [facility]   Is patient eligible?  Yes   Discharge diagnosis  Pneumonia   Does the patient have one of the following disease processes/diagnoses(primary or secondary)?  COPD/Pneumonia   Does the patient have Home health ordered?  No   Is there a DME ordered?  No   Comments regarding appointments  See AVS   General alerts for this patient  Lives at home with sonKeyon   Prep survey completed?  Yes          Sydnee Pantoja RN

## 2019-03-10 LAB — BACTERIA SPEC AEROBE CULT: NORMAL

## 2019-03-12 ENCOUNTER — READMISSION MANAGEMENT (OUTPATIENT)
Dept: CALL CENTER | Facility: HOSPITAL | Age: 74
End: 2019-03-12

## 2019-03-12 NOTE — OUTREACH NOTE
COPD/PN Week 1 Survey      Responses   Facility patient discharged from?  [facility]   Does the patient have one of the following disease processes/diagnoses(primary or secondary)?  COPD/Pneumonia   Is there a successful TCM telephone encounter documented?  No   Was the primary reason for admission:  Pneumonia   Week 1 attempt successful?  No   Unsuccessful attempts  Attempt 1          Barbara Tyson RN

## 2019-03-13 ENCOUNTER — READMISSION MANAGEMENT (OUTPATIENT)
Dept: CALL CENTER | Facility: HOSPITAL | Age: 74
End: 2019-03-13

## 2019-03-13 NOTE — OUTREACH NOTE
COPD/PN Week 1 Survey      Responses   Facility patient discharged from?  [facility]   Does the patient have one of the following disease processes/diagnoses(primary or secondary)?  COPD/Pneumonia   Is there a successful TCM telephone encounter documented?  No   Was the primary reason for admission:  Pneumonia   Week 1 attempt successful?  No   Unsuccessful attempts  Attempt 2          Ely Duran, RN

## 2019-03-18 ENCOUNTER — READMISSION MANAGEMENT (OUTPATIENT)
Dept: CALL CENTER | Facility: HOSPITAL | Age: 74
End: 2019-03-18

## 2019-03-18 NOTE — OUTREACH NOTE
COPD/PN Week 2 Survey      Responses   Facility patient discharged from?  [facility]   Does the patient have one of the following disease processes/diagnoses(primary or secondary)?  COPD/Pneumonia   Week 2 attempt successful?  No   Unsuccessful attempts  Attempt 1          Alice Gonzalez RN

## 2019-04-23 ENCOUNTER — HOSPITAL ENCOUNTER (INPATIENT)
Facility: HOSPITAL | Age: 74
LOS: 5 days | Discharge: HOME | End: 2019-04-28
Attending: EMERGENCY MEDICINE | Admitting: HOSPITALIST

## 2019-04-23 ENCOUNTER — APPOINTMENT (OUTPATIENT)
Dept: GENERAL RADIOLOGY | Facility: HOSPITAL | Age: 74
End: 2019-04-23

## 2019-04-23 DIAGNOSIS — J18.9 PNEUMONIA OF BOTH LUNGS DUE TO INFECTIOUS ORGANISM, UNSPECIFIED PART OF LUNG: ICD-10-CM

## 2019-04-23 DIAGNOSIS — A41.9 SEPSIS, DUE TO UNSPECIFIED ORGANISM: Primary | ICD-10-CM

## 2019-04-23 LAB
A-A DO2: 38.4 MMHG (ref 0–300)
ALBUMIN SERPL-MCNC: 3.55 G/DL (ref 3.5–5.2)
ALBUMIN/GLOB SERPL: 1.1 G/DL
ALP SERPL-CCNC: 87 U/L (ref 39–117)
ALT SERPL W P-5'-P-CCNC: 10 U/L (ref 1–41)
ANION GAP SERPL CALCULATED.3IONS-SCNC: 11 MMOL/L
ARTERIAL PATENCY WRIST A: ABNORMAL
AST SERPL-CCNC: 17 U/L (ref 1–40)
ATMOSPHERIC PRESS: 729 MMHG
BASE EXCESS BLDA CALC-SCNC: -1.3 MMOL/L
BASOPHILS # BLD AUTO: 0.03 10*3/MM3 (ref 0–0.2)
BASOPHILS NFR BLD AUTO: 0.2 % (ref 0–1.5)
BDY SITE: ABNORMAL
BILIRUB SERPL-MCNC: 1 MG/DL (ref 0.2–1.2)
BILIRUB UR QL STRIP: NEGATIVE
BODY TEMPERATURE: 98.6 C
BUN BLD-MCNC: 20 MG/DL (ref 8–23)
BUN/CREAT SERPL: 18 (ref 7–25)
CALCIUM SPEC-SCNC: 9 MG/DL (ref 8.6–10.5)
CHLORIDE SERPL-SCNC: 101 MMOL/L (ref 98–107)
CLARITY UR: ABNORMAL
CO2 SERPL-SCNC: 23 MMOL/L (ref 22–29)
COHGB MFR BLD: 1.9 % (ref 0–5)
COLOR UR: YELLOW
CREAT BLD-MCNC: 1.11 MG/DL (ref 0.76–1.27)
D-LACTATE SERPL-SCNC: 1.2 MMOL/L (ref 0.5–2)
DEPRECATED RDW RBC AUTO: 53.8 FL (ref 37–54)
EOSINOPHIL # BLD AUTO: 0.01 10*3/MM3 (ref 0–0.4)
EOSINOPHIL NFR BLD AUTO: 0.1 % (ref 0.3–6.2)
ERYTHROCYTE [DISTWIDTH] IN BLOOD BY AUTOMATED COUNT: 16.4 % (ref 12.3–15.4)
FLUAV AG NPH QL: NEGATIVE
FLUBV AG NPH QL IA: NEGATIVE
GFR SERPL CREATININE-BSD FRML MDRD: 65 ML/MIN/1.73
GLOBULIN UR ELPH-MCNC: 3.3 GM/DL
GLUCOSE BLD-MCNC: 102 MG/DL (ref 65–99)
GLUCOSE UR STRIP-MCNC: NEGATIVE MG/DL
HCO3 BLDA-SCNC: 21.9 MMOL/L (ref 22–26)
HCT VFR BLD AUTO: 32 % (ref 37.5–51)
HCT VFR BLD CALC: 33 % (ref 42–52)
HGB BLD-MCNC: 10.3 G/DL (ref 13–17.7)
HGB BLDA-MCNC: 11.2 G/DL (ref 12–16)
HGB UR QL STRIP.AUTO: NEGATIVE
HOROWITZ INDEX BLD+IHG-RTO: 21 %
IMM GRANULOCYTES # BLD AUTO: 0.03 10*3/MM3 (ref 0–0.05)
IMM GRANULOCYTES NFR BLD AUTO: 0.2 % (ref 0–0.5)
KETONES UR QL STRIP: NEGATIVE
L PNEUMO1 AG UR QL IA: NEGATIVE
LEUKOCYTE ESTERASE UR QL STRIP.AUTO: NEGATIVE
LYMPHOCYTES # BLD AUTO: 1.13 10*3/MM3 (ref 0.7–3.1)
LYMPHOCYTES NFR BLD AUTO: 7.8 % (ref 19.6–45.3)
M PNEUMO IGM SER QL: NEGATIVE
MCH RBC QN AUTO: 29.6 PG (ref 26.6–33)
MCHC RBC AUTO-ENTMCNC: 32.2 G/DL (ref 31.5–35.7)
MCV RBC AUTO: 92 FL (ref 79–97)
METHGB BLD QL: 0.3 % (ref 0–3)
MODALITY: ABNORMAL
MONOCYTES # BLD AUTO: 1.35 10*3/MM3 (ref 0.1–0.9)
MONOCYTES NFR BLD AUTO: 9.3 % (ref 5–12)
NEUTROPHILS # BLD AUTO: 11.9 10*3/MM3 (ref 1.7–7)
NEUTROPHILS NFR BLD AUTO: 82.4 % (ref 42.7–76)
NITRITE UR QL STRIP: NEGATIVE
NOTE: ABNORMAL
NT-PROBNP SERPL-MCNC: 2409 PG/ML (ref 5–1800)
OXYHGB MFR BLDV: 91.5 % (ref 85–100)
PCO2 BLDA: 31.7 MM HG (ref 35–45)
PCO2 TEMP ADJ BLD: ABNORMAL MM HG (ref 35–48)
PH BLDA: 7.46 PH UNITS (ref 7.35–7.45)
PH UR STRIP.AUTO: 6 [PH] (ref 5–8)
PH, TEMP CORRECTED: ABNORMAL PH UNITS (ref 7.35–7.45)
PLATELET # BLD AUTO: 199 10*3/MM3 (ref 140–450)
PMV BLD AUTO: 11 FL (ref 6–12)
PO2 BLDA: 66.9 MM HG (ref 80–100)
PO2 TEMP ADJ BLD: ABNORMAL MM HG (ref 83–108)
POTASSIUM BLD-SCNC: 4 MMOL/L (ref 3.5–5.2)
PROT SERPL-MCNC: 6.8 G/DL (ref 6–8.5)
PROT UR QL STRIP: NEGATIVE
RBC # BLD AUTO: 3.48 10*6/MM3 (ref 4.14–5.8)
SAO2 % BLDCOA: 93.6 % (ref 90–100)
SODIUM BLD-SCNC: 135 MMOL/L (ref 136–145)
SP GR UR STRIP: 1.02 (ref 1–1.03)
TROPONIN T SERPL-MCNC: <0.01 NG/ML (ref 0–0.03)
TSH SERPL DL<=0.05 MIU/L-ACNC: 2.07 MIU/ML (ref 0.27–4.2)
UROBILINOGEN UR QL STRIP: ABNORMAL
WBC NRBC COR # BLD: 14.45 10*3/MM3 (ref 3.4–10.8)

## 2019-04-23 PROCEDURE — 94799 UNLISTED PULMONARY SVC/PX: CPT

## 2019-04-23 PROCEDURE — 85025 COMPLETE CBC W/AUTO DIFF WBC: CPT | Performed by: PHYSICIAN ASSISTANT

## 2019-04-23 PROCEDURE — 87804 INFLUENZA ASSAY W/OPTIC: CPT | Performed by: PHYSICIAN ASSISTANT

## 2019-04-23 PROCEDURE — 25010000002 METHYLPREDNISOLONE PER 40 MG: Performed by: HOSPITALIST

## 2019-04-23 PROCEDURE — 25010000002 VANCOMYCIN 5 G RECONSTITUTED SOLUTION 5,000 MG VIAL: Performed by: PHYSICIAN ASSISTANT

## 2019-04-23 PROCEDURE — 94640 AIRWAY INHALATION TREATMENT: CPT

## 2019-04-23 PROCEDURE — 81003 URINALYSIS AUTO W/O SCOPE: CPT | Performed by: PHYSICIAN ASSISTANT

## 2019-04-23 PROCEDURE — 71045 X-RAY EXAM CHEST 1 VIEW: CPT

## 2019-04-23 PROCEDURE — 93010 ELECTROCARDIOGRAM REPORT: CPT | Performed by: INTERNAL MEDICINE

## 2019-04-23 PROCEDURE — 83880 ASSAY OF NATRIURETIC PEPTIDE: CPT | Performed by: PHYSICIAN ASSISTANT

## 2019-04-23 PROCEDURE — 99223 1ST HOSP IP/OBS HIGH 75: CPT | Performed by: HOSPITALIST

## 2019-04-23 PROCEDURE — 63710000001 PREDNISONE PER 1 MG: Performed by: NURSE PRACTITIONER

## 2019-04-23 PROCEDURE — 82375 ASSAY CARBOXYHB QUANT: CPT | Performed by: PHYSICIAN ASSISTANT

## 2019-04-23 PROCEDURE — 83605 ASSAY OF LACTIC ACID: CPT | Performed by: PHYSICIAN ASSISTANT

## 2019-04-23 PROCEDURE — 87040 BLOOD CULTURE FOR BACTERIA: CPT | Performed by: PHYSICIAN ASSISTANT

## 2019-04-23 PROCEDURE — 86738 MYCOPLASMA ANTIBODY: CPT | Performed by: NURSE PRACTITIONER

## 2019-04-23 PROCEDURE — 93005 ELECTROCARDIOGRAM TRACING: CPT | Performed by: EMERGENCY MEDICINE

## 2019-04-23 PROCEDURE — 83050 HGB METHEMOGLOBIN QUAN: CPT | Performed by: PHYSICIAN ASSISTANT

## 2019-04-23 PROCEDURE — 82805 BLOOD GASES W/O2 SATURATION: CPT | Performed by: PHYSICIAN ASSISTANT

## 2019-04-23 PROCEDURE — 99284 EMERGENCY DEPT VISIT MOD MDM: CPT

## 2019-04-23 PROCEDURE — 84484 ASSAY OF TROPONIN QUANT: CPT | Performed by: PHYSICIAN ASSISTANT

## 2019-04-23 PROCEDURE — 87899 AGENT NOS ASSAY W/OPTIC: CPT | Performed by: NURSE PRACTITIONER

## 2019-04-23 PROCEDURE — 84484 ASSAY OF TROPONIN QUANT: CPT | Performed by: NURSE PRACTITIONER

## 2019-04-23 PROCEDURE — 84443 ASSAY THYROID STIM HORMONE: CPT | Performed by: NURSE PRACTITIONER

## 2019-04-23 PROCEDURE — 71045 X-RAY EXAM CHEST 1 VIEW: CPT | Performed by: RADIOLOGY

## 2019-04-23 PROCEDURE — 80053 COMPREHEN METABOLIC PANEL: CPT | Performed by: PHYSICIAN ASSISTANT

## 2019-04-23 PROCEDURE — 36600 WITHDRAWAL OF ARTERIAL BLOOD: CPT | Performed by: PHYSICIAN ASSISTANT

## 2019-04-23 RX ORDER — HYDROCODONE BITARTRATE AND ACETAMINOPHEN 5; 325 MG/1; MG/1
1 TABLET ORAL EVERY 12 HOURS PRN
Status: DISCONTINUED | OUTPATIENT
Start: 2019-04-23 | End: 2019-04-28 | Stop reason: HOSPADM

## 2019-04-23 RX ORDER — PANTOPRAZOLE SODIUM 40 MG/1
40 TABLET, DELAYED RELEASE ORAL 2 TIMES DAILY
Status: DISCONTINUED | OUTPATIENT
Start: 2019-04-23 | End: 2019-04-28 | Stop reason: HOSPADM

## 2019-04-23 RX ORDER — NITROGLYCERIN 0.4 MG/1
0.4 TABLET SUBLINGUAL
Status: DISCONTINUED | OUTPATIENT
Start: 2019-04-23 | End: 2019-04-28 | Stop reason: HOSPADM

## 2019-04-23 RX ORDER — ASPIRIN 81 MG/1
81 TABLET ORAL DAILY
Status: DISCONTINUED | OUTPATIENT
Start: 2019-04-24 | End: 2019-04-28 | Stop reason: HOSPADM

## 2019-04-23 RX ORDER — CARVEDILOL 12.5 MG/1
12.5 TABLET ORAL 2 TIMES DAILY WITH MEALS
COMMUNITY
End: 2019-04-28 | Stop reason: HOSPADM

## 2019-04-23 RX ORDER — METOPROLOL SUCCINATE 25 MG/1
25 TABLET, EXTENDED RELEASE ORAL EVERY 12 HOURS
Status: DISCONTINUED | OUTPATIENT
Start: 2019-04-23 | End: 2019-04-24

## 2019-04-23 RX ORDER — METOPROLOL SUCCINATE 25 MG/1
25 TABLET, EXTENDED RELEASE ORAL EVERY 12 HOURS
COMMUNITY

## 2019-04-23 RX ORDER — LEVOTHYROXINE SODIUM 0.1 MG/1
100 TABLET ORAL DAILY
Status: DISCONTINUED | OUTPATIENT
Start: 2019-04-24 | End: 2019-04-28 | Stop reason: HOSPADM

## 2019-04-23 RX ORDER — PROMETHAZINE HYDROCHLORIDE 25 MG/1
25 TABLET ORAL DAILY PRN
Status: DISCONTINUED | OUTPATIENT
Start: 2019-04-23 | End: 2019-04-28 | Stop reason: HOSPADM

## 2019-04-23 RX ORDER — CLOPIDOGREL BISULFATE 75 MG/1
75 TABLET ORAL DAILY
Status: DISCONTINUED | OUTPATIENT
Start: 2019-04-24 | End: 2019-04-28 | Stop reason: HOSPADM

## 2019-04-23 RX ORDER — PROMETHAZINE HYDROCHLORIDE 25 MG/1
25 TABLET ORAL DAILY PRN
COMMUNITY

## 2019-04-23 RX ORDER — ALBUTEROL SULFATE 2.5 MG/3ML
2.5 SOLUTION RESPIRATORY (INHALATION) EVERY 6 HOURS PRN
COMMUNITY
End: 2019-04-28 | Stop reason: HOSPADM

## 2019-04-23 RX ORDER — IPRATROPIUM BROMIDE AND ALBUTEROL SULFATE 2.5; .5 MG/3ML; MG/3ML
3 SOLUTION RESPIRATORY (INHALATION)
Status: DISCONTINUED | OUTPATIENT
Start: 2019-04-23 | End: 2019-04-28 | Stop reason: HOSPADM

## 2019-04-23 RX ORDER — IPRATROPIUM BROMIDE AND ALBUTEROL SULFATE 2.5; .5 MG/3ML; MG/3ML
3 SOLUTION RESPIRATORY (INHALATION) ONCE
Status: COMPLETED | OUTPATIENT
Start: 2019-04-23 | End: 2019-04-23

## 2019-04-23 RX ORDER — L.ACID,PARA/B.BIFIDUM/S.THERM 8B CELL
1 CAPSULE ORAL DAILY
Status: DISCONTINUED | OUTPATIENT
Start: 2019-04-23 | End: 2019-04-28 | Stop reason: HOSPADM

## 2019-04-23 RX ORDER — HYDROCODONE BITARTRATE AND ACETAMINOPHEN 5; 325 MG/1; MG/1
1 TABLET ORAL EVERY 12 HOURS PRN
COMMUNITY

## 2019-04-23 RX ORDER — ALBUTEROL SULFATE 2.5 MG/3ML
2.5 SOLUTION RESPIRATORY (INHALATION) EVERY 6 HOURS PRN
Status: CANCELLED | OUTPATIENT
Start: 2019-04-23

## 2019-04-23 RX ORDER — BUDESONIDE 0.5 MG/2ML
0.5 INHALANT ORAL
Status: DISCONTINUED | OUTPATIENT
Start: 2019-04-23 | End: 2019-04-28 | Stop reason: HOSPADM

## 2019-04-23 RX ORDER — METHYLPREDNISOLONE SODIUM SUCCINATE 40 MG/ML
40 INJECTION, POWDER, LYOPHILIZED, FOR SOLUTION INTRAMUSCULAR; INTRAVENOUS EVERY 12 HOURS
Status: DISCONTINUED | OUTPATIENT
Start: 2019-04-23 | End: 2019-04-24

## 2019-04-23 RX ORDER — SODIUM CHLORIDE 0.9 % (FLUSH) 0.9 %
1-10 SYRINGE (ML) INJECTION AS NEEDED
Status: DISCONTINUED | OUTPATIENT
Start: 2019-04-23 | End: 2019-04-28 | Stop reason: HOSPADM

## 2019-04-23 RX ORDER — ATORVASTATIN CALCIUM 20 MG/1
20 TABLET, FILM COATED ORAL NIGHTLY
Status: DISCONTINUED | OUTPATIENT
Start: 2019-04-23 | End: 2019-04-28 | Stop reason: HOSPADM

## 2019-04-23 RX ORDER — PREDNISONE 20 MG/1
40 TABLET ORAL
Status: DISCONTINUED | OUTPATIENT
Start: 2019-04-23 | End: 2019-04-23

## 2019-04-23 RX ORDER — ARFORMOTEROL TARTRATE 15 UG/2ML
15 SOLUTION RESPIRATORY (INHALATION)
Status: DISCONTINUED | OUTPATIENT
Start: 2019-04-23 | End: 2019-04-28 | Stop reason: HOSPADM

## 2019-04-23 RX ORDER — ALBUTEROL SULFATE 90 UG/1
2 AEROSOL, METERED RESPIRATORY (INHALATION) EVERY 6 HOURS PRN
COMMUNITY

## 2019-04-23 RX ORDER — SODIUM CHLORIDE 0.9 % (FLUSH) 0.9 %
3 SYRINGE (ML) INJECTION EVERY 12 HOURS SCHEDULED
Status: DISCONTINUED | OUTPATIENT
Start: 2019-04-23 | End: 2019-04-28 | Stop reason: HOSPADM

## 2019-04-23 RX ORDER — CARVEDILOL 6.25 MG/1
12.5 TABLET ORAL 2 TIMES DAILY WITH MEALS
Status: DISCONTINUED | OUTPATIENT
Start: 2019-04-23 | End: 2019-04-24

## 2019-04-23 RX ADMIN — APIXABAN 5 MG: 5 TABLET, FILM COATED ORAL at 22:26

## 2019-04-23 RX ADMIN — VANCOMYCIN HYDROCHLORIDE 1500 MG: 5 INJECTION, POWDER, LYOPHILIZED, FOR SOLUTION INTRAVENOUS at 13:32

## 2019-04-23 RX ADMIN — METOPROLOL SUCCINATE 25 MG: 25 TABLET, FILM COATED, EXTENDED RELEASE ORAL at 22:28

## 2019-04-23 RX ADMIN — AZTREONAM 2 G: 2 INJECTION, POWDER, FOR SOLUTION INTRAMUSCULAR; INTRAVENOUS at 20:33

## 2019-04-23 RX ADMIN — AZTREONAM 2 G: 2 INJECTION, POWDER, FOR SOLUTION INTRAMUSCULAR; INTRAVENOUS at 13:38

## 2019-04-23 RX ADMIN — SODIUM CHLORIDE 2244 ML: 9 INJECTION, SOLUTION INTRAVENOUS at 12:37

## 2019-04-23 RX ADMIN — PREDNISONE 40 MG: 20 TABLET ORAL at 17:13

## 2019-04-23 RX ADMIN — PANTOPRAZOLE SODIUM 40 MG: 40 TABLET, DELAYED RELEASE ORAL at 22:26

## 2019-04-23 RX ADMIN — ATORVASTATIN CALCIUM 20 MG: 20 TABLET, FILM COATED ORAL at 22:26

## 2019-04-23 RX ADMIN — DRONEDARONE 400 MG: 400 TABLET, FILM COATED ORAL at 22:28

## 2019-04-23 RX ADMIN — METHYLPREDNISOLONE SODIUM SUCCINATE 40 MG: 40 INJECTION, POWDER, FOR SOLUTION INTRAMUSCULAR; INTRAVENOUS at 20:33

## 2019-04-23 RX ADMIN — BUDESONIDE 0.5 MG: 0.5 INHALANT RESPIRATORY (INHALATION) at 18:58

## 2019-04-23 RX ADMIN — CARVEDILOL 12.5 MG: 6.25 TABLET, FILM COATED ORAL at 22:28

## 2019-04-23 RX ADMIN — Medication 1 CAPSULE: at 17:13

## 2019-04-23 RX ADMIN — IPRATROPIUM BROMIDE AND ALBUTEROL SULFATE 3 ML: .5; 3 SOLUTION RESPIRATORY (INHALATION) at 12:18

## 2019-04-23 RX ADMIN — ARFORMOTEROL TARTRATE 15 MCG: 15 SOLUTION RESPIRATORY (INHALATION) at 18:58

## 2019-04-24 ENCOUNTER — APPOINTMENT (OUTPATIENT)
Dept: CARDIOLOGY | Facility: HOSPITAL | Age: 74
End: 2019-04-24

## 2019-04-24 LAB
ALBUMIN SERPL-MCNC: 2.99 G/DL (ref 3.5–5.2)
ALBUMIN/GLOB SERPL: 0.9 G/DL
ALP SERPL-CCNC: 82 U/L (ref 39–117)
ALT SERPL W P-5'-P-CCNC: 10 U/L (ref 1–41)
ANION GAP SERPL CALCULATED.3IONS-SCNC: 12.2 MMOL/L
ANISOCYTOSIS BLD QL: ABNORMAL
AST SERPL-CCNC: 14 U/L (ref 1–40)
BH CV ECHO MEAS - ACS: 0.78 CM
BH CV ECHO MEAS - AO MAX PG (FULL): 12.9 MMHG
BH CV ECHO MEAS - AO MAX PG: 17.3 MMHG
BH CV ECHO MEAS - AO MEAN PG (FULL): 6.9 MMHG
BH CV ECHO MEAS - AO MEAN PG: 9.2 MMHG
BH CV ECHO MEAS - AO ROOT AREA (BSA CORRECTED): 1.8
BH CV ECHO MEAS - AO ROOT AREA: 8.8 CM^2
BH CV ECHO MEAS - AO ROOT DIAM: 3.3 CM
BH CV ECHO MEAS - AO V2 MAX: 208.2 CM/SEC
BH CV ECHO MEAS - AO V2 MEAN: 140.9 CM/SEC
BH CV ECHO MEAS - AO V2 VTI: 41.4 CM
BH CV ECHO MEAS - AVA(I,A): 1.8 CM^2
BH CV ECHO MEAS - AVA(I,D): 1.8 CM^2
BH CV ECHO MEAS - AVA(V,A): 1.7 CM^2
BH CV ECHO MEAS - AVA(V,D): 1.7 CM^2
BH CV ECHO MEAS - BSA(HAYCOCK): 1.9 M^2
BH CV ECHO MEAS - BSA: 1.8 M^2
BH CV ECHO MEAS - BZI_BMI: 27.5 KILOGRAMS/M^2
BH CV ECHO MEAS - BZI_METRIC_HEIGHT: 165.1 CM
BH CV ECHO MEAS - BZI_METRIC_WEIGHT: 74.8 KG
BH CV ECHO MEAS - EDV(CUBED): 71.1 ML
BH CV ECHO MEAS - EDV(MOD-SP4): 32 ML
BH CV ECHO MEAS - EDV(TEICH): 76.1 ML
BH CV ECHO MEAS - EF(CUBED): 55.9 %
BH CV ECHO MEAS - EF(MOD-SP4): 65.6 %
BH CV ECHO MEAS - EF(TEICH): 48 %
BH CV ECHO MEAS - ESV(CUBED): 31.4 ML
BH CV ECHO MEAS - ESV(MOD-SP4): 11 ML
BH CV ECHO MEAS - ESV(TEICH): 39.5 ML
BH CV ECHO MEAS - FS: 23.9 %
BH CV ECHO MEAS - IVS/LVPW: 1.3
BH CV ECHO MEAS - IVSD: 1.7 CM
BH CV ECHO MEAS - LA DIMENSION: 4.6 CM
BH CV ECHO MEAS - LA/AO: 1.4
BH CV ECHO MEAS - LV DIASTOLIC VOL/BSA (35-75): 17.6 ML/M^2
BH CV ECHO MEAS - LV MASS(C)D: 232.1 GRAMS
BH CV ECHO MEAS - LV MASS(C)DI: 127.3 GRAMS/M^2
BH CV ECHO MEAS - LV MAX PG: 4.4 MMHG
BH CV ECHO MEAS - LV MEAN PG: 2.3 MMHG
BH CV ECHO MEAS - LV SYSTOLIC VOL/BSA (12-30): 6 ML/M^2
BH CV ECHO MEAS - LV V1 MAX: 105.1 CM/SEC
BH CV ECHO MEAS - LV V1 MEAN: 71 CM/SEC
BH CV ECHO MEAS - LV V1 VTI: 22.8 CM
BH CV ECHO MEAS - LVIDD: 4.1 CM
BH CV ECHO MEAS - LVIDS: 3.2 CM
BH CV ECHO MEAS - LVLD AP4: 7.5 CM
BH CV ECHO MEAS - LVLS AP4: 6.5 CM
BH CV ECHO MEAS - LVOT AREA (M): 3.5 CM^2
BH CV ECHO MEAS - LVOT AREA: 3.3 CM^2
BH CV ECHO MEAS - LVOT DIAM: 2.1 CM
BH CV ECHO MEAS - LVPWD: 1.2 CM
BH CV ECHO MEAS - MV A MAX VEL: 69.6 CM/SEC
BH CV ECHO MEAS - MV E MAX VEL: 131.8 CM/SEC
BH CV ECHO MEAS - MV E/A: 1.9
BH CV ECHO MEAS - PA ACC SLOPE: 1036 CM/SEC^2
BH CV ECHO MEAS - PA ACC TIME: 0.11 SEC
BH CV ECHO MEAS - PA PR(ACCEL): 28.3 MMHG
BH CV ECHO MEAS - RAP SYSTOLE: 10 MMHG
BH CV ECHO MEAS - RVDD: 3.3 CM
BH CV ECHO MEAS - RVSP: 32.4 MMHG
BH CV ECHO MEAS - SI(AO): 199.6 ML/M^2
BH CV ECHO MEAS - SI(CUBED): 21.8 ML/M^2
BH CV ECHO MEAS - SI(LVOT): 41.5 ML/M^2
BH CV ECHO MEAS - SI(MOD-SP4): 11.5 ML/M^2
BH CV ECHO MEAS - SI(TEICH): 20 ML/M^2
BH CV ECHO MEAS - SV(AO): 363.9 ML
BH CV ECHO MEAS - SV(CUBED): 39.7 ML
BH CV ECHO MEAS - SV(LVOT): 75.6 ML
BH CV ECHO MEAS - SV(MOD-SP4): 21 ML
BH CV ECHO MEAS - SV(TEICH): 36.5 ML
BH CV ECHO MEAS - TR MAX VEL: 236.5 CM/SEC
BILIRUB SERPL-MCNC: 0.8 MG/DL (ref 0.2–1.2)
BUN BLD-MCNC: 17 MG/DL (ref 8–23)
BUN/CREAT SERPL: 18.7 (ref 7–25)
CALCIUM SPEC-SCNC: 8.3 MG/DL (ref 8.6–10.5)
CHLORIDE SERPL-SCNC: 106 MMOL/L (ref 98–107)
CO2 SERPL-SCNC: 19.8 MMOL/L (ref 22–29)
CREAT BLD-MCNC: 0.91 MG/DL (ref 0.76–1.27)
DEPRECATED RDW RBC AUTO: 54.2 FL (ref 37–54)
ERYTHROCYTE [DISTWIDTH] IN BLOOD BY AUTOMATED COUNT: 16.6 % (ref 12.3–15.4)
GFR SERPL CREATININE-BSD FRML MDRD: 81 ML/MIN/1.73
GLOBULIN UR ELPH-MCNC: 3.3 GM/DL
GLUCOSE BLD-MCNC: 157 MG/DL (ref 65–99)
HCT VFR BLD AUTO: 30.3 % (ref 37.5–51)
HGB BLD-MCNC: 9.7 G/DL (ref 13–17.7)
HYPOCHROMIA BLD QL: ABNORMAL
LYMPHOCYTES # BLD MANUAL: 0.46 10*3/MM3 (ref 0.7–3.1)
LYMPHOCYTES NFR BLD MANUAL: 5 % (ref 19.6–45.3)
MAGNESIUM SERPL-MCNC: 1.7 MG/DL (ref 1.6–2.4)
MAXIMAL PREDICTED HEART RATE: 145 BPM
MCH RBC QN AUTO: 29.7 PG (ref 26.6–33)
MCHC RBC AUTO-ENTMCNC: 32 G/DL (ref 31.5–35.7)
MCV RBC AUTO: 92.7 FL (ref 79–97)
NEUTROPHILS # BLD AUTO: 8.78 10*3/MM3 (ref 1.7–7)
NEUTROPHILS NFR BLD MANUAL: 95 % (ref 42.7–76)
NT-PROBNP SERPL-MCNC: 1869 PG/ML (ref 5–1800)
PHOSPHATE SERPL-MCNC: 2.8 MG/DL (ref 2.5–4.5)
PLAT MORPH BLD: NORMAL
PLATELET # BLD AUTO: 167 10*3/MM3 (ref 140–450)
PMV BLD AUTO: 10.8 FL (ref 6–12)
POTASSIUM BLD-SCNC: 4 MMOL/L (ref 3.5–5.2)
PROT SERPL-MCNC: 6.3 G/DL (ref 6–8.5)
RBC # BLD AUTO: 3.27 10*6/MM3 (ref 4.14–5.8)
SODIUM BLD-SCNC: 138 MMOL/L (ref 136–145)
STRESS TARGET HR: 123 BPM
TROPONIN T SERPL-MCNC: <0.01 NG/ML (ref 0–0.03)
WBC NRBC COR # BLD: 9.24 10*3/MM3 (ref 3.4–10.8)

## 2019-04-24 PROCEDURE — 83735 ASSAY OF MAGNESIUM: CPT | Performed by: NURSE PRACTITIONER

## 2019-04-24 PROCEDURE — 93306 TTE W/DOPPLER COMPLETE: CPT

## 2019-04-24 PROCEDURE — 85007 BL SMEAR W/DIFF WBC COUNT: CPT | Performed by: NURSE PRACTITIONER

## 2019-04-24 PROCEDURE — 25010000002 VANCOMYCIN 5 G RECONSTITUTED SOLUTION 5,000 MG VIAL: Performed by: HOSPITALIST

## 2019-04-24 PROCEDURE — 84100 ASSAY OF PHOSPHORUS: CPT | Performed by: NURSE PRACTITIONER

## 2019-04-24 PROCEDURE — 94799 UNLISTED PULMONARY SVC/PX: CPT

## 2019-04-24 PROCEDURE — 83880 ASSAY OF NATRIURETIC PEPTIDE: CPT | Performed by: NURSE PRACTITIONER

## 2019-04-24 PROCEDURE — 99232 SBSQ HOSP IP/OBS MODERATE 35: CPT | Performed by: HOSPITALIST

## 2019-04-24 PROCEDURE — 84484 ASSAY OF TROPONIN QUANT: CPT | Performed by: NURSE PRACTITIONER

## 2019-04-24 PROCEDURE — 80053 COMPREHEN METABOLIC PANEL: CPT | Performed by: NURSE PRACTITIONER

## 2019-04-24 PROCEDURE — 25010000002 METHYLPREDNISOLONE PER 40 MG: Performed by: HOSPITALIST

## 2019-04-24 PROCEDURE — 85027 COMPLETE CBC AUTOMATED: CPT | Performed by: NURSE PRACTITIONER

## 2019-04-24 RX ORDER — MAGNESIUM SULFATE HEPTAHYDRATE 40 MG/ML
4 INJECTION, SOLUTION INTRAVENOUS AS NEEDED
Status: DISCONTINUED | OUTPATIENT
Start: 2019-04-24 | End: 2019-04-28 | Stop reason: HOSPADM

## 2019-04-24 RX ORDER — MAGNESIUM SULFATE HEPTAHYDRATE 40 MG/ML
2 INJECTION, SOLUTION INTRAVENOUS AS NEEDED
Status: DISCONTINUED | OUTPATIENT
Start: 2019-04-24 | End: 2019-04-28 | Stop reason: HOSPADM

## 2019-04-24 RX ORDER — METOPROLOL SUCCINATE 25 MG/1
25 TABLET, EXTENDED RELEASE ORAL EVERY 12 HOURS SCHEDULED
Status: DISCONTINUED | OUTPATIENT
Start: 2019-04-24 | End: 2019-04-28 | Stop reason: HOSPADM

## 2019-04-24 RX ORDER — POTASSIUM CHLORIDE 7.45 MG/ML
10 INJECTION INTRAVENOUS
Status: DISCONTINUED | OUTPATIENT
Start: 2019-04-24 | End: 2019-04-28 | Stop reason: HOSPADM

## 2019-04-24 RX ORDER — PREDNISONE 20 MG/1
40 TABLET ORAL
Status: DISCONTINUED | OUTPATIENT
Start: 2019-04-25 | End: 2019-04-28 | Stop reason: HOSPADM

## 2019-04-24 RX ORDER — MAGNESIUM SULFATE HEPTAHYDRATE 40 MG/ML
4 INJECTION, SOLUTION INTRAVENOUS ONCE
Status: COMPLETED | OUTPATIENT
Start: 2019-04-24 | End: 2019-04-24

## 2019-04-24 RX ADMIN — APIXABAN 5 MG: 5 TABLET, FILM COATED ORAL at 08:33

## 2019-04-24 RX ADMIN — SODIUM CHLORIDE, PRESERVATIVE FREE 3 ML: 5 INJECTION INTRAVENOUS at 20:06

## 2019-04-24 RX ADMIN — MAGNESIUM SULFATE HEPTAHYDRATE 4 G: 40 INJECTION, SOLUTION INTRAVENOUS at 16:32

## 2019-04-24 RX ADMIN — ASPIRIN 81 MG: 81 TABLET ORAL at 08:33

## 2019-04-24 RX ADMIN — IPRATROPIUM BROMIDE AND ALBUTEROL SULFATE 3 ML: .5; 3 SOLUTION RESPIRATORY (INHALATION) at 18:23

## 2019-04-24 RX ADMIN — PANTOPRAZOLE SODIUM 40 MG: 40 TABLET, DELAYED RELEASE ORAL at 08:33

## 2019-04-24 RX ADMIN — PANTOPRAZOLE SODIUM 40 MG: 40 TABLET, DELAYED RELEASE ORAL at 20:06

## 2019-04-24 RX ADMIN — APIXABAN 5 MG: 5 TABLET, FILM COATED ORAL at 20:05

## 2019-04-24 RX ADMIN — IPRATROPIUM BROMIDE AND ALBUTEROL SULFATE 3 ML: .5; 3 SOLUTION RESPIRATORY (INHALATION) at 02:38

## 2019-04-24 RX ADMIN — ATORVASTATIN CALCIUM 20 MG: 20 TABLET, FILM COATED ORAL at 20:06

## 2019-04-24 RX ADMIN — METHYLPREDNISOLONE SODIUM SUCCINATE 40 MG: 40 INJECTION, POWDER, FOR SOLUTION INTRAMUSCULAR; INTRAVENOUS at 05:36

## 2019-04-24 RX ADMIN — CARVEDILOL 12.5 MG: 6.25 TABLET, FILM COATED ORAL at 08:33

## 2019-04-24 RX ADMIN — Medication 1 CAPSULE: at 08:33

## 2019-04-24 RX ADMIN — METOPROLOL SUCCINATE 25 MG: 25 TABLET, FILM COATED, EXTENDED RELEASE ORAL at 20:05

## 2019-04-24 RX ADMIN — LEVOTHYROXINE SODIUM 100 MCG: 100 TABLET ORAL at 08:33

## 2019-04-24 RX ADMIN — IPRATROPIUM BROMIDE AND ALBUTEROL SULFATE 3 ML: .5; 3 SOLUTION RESPIRATORY (INHALATION) at 07:16

## 2019-04-24 RX ADMIN — METOPROLOL SUCCINATE 25 MG: 25 TABLET, FILM COATED, EXTENDED RELEASE ORAL at 08:39

## 2019-04-24 RX ADMIN — AZTREONAM 2 G: 2 INJECTION, POWDER, FOR SOLUTION INTRAMUSCULAR; INTRAVENOUS at 13:29

## 2019-04-24 RX ADMIN — AZTREONAM 2 G: 2 INJECTION, POWDER, FOR SOLUTION INTRAMUSCULAR; INTRAVENOUS at 05:35

## 2019-04-24 RX ADMIN — DRONEDARONE 400 MG: 400 TABLET, FILM COATED ORAL at 16:38

## 2019-04-24 RX ADMIN — DRONEDARONE 400 MG: 400 TABLET, FILM COATED ORAL at 08:33

## 2019-04-24 RX ADMIN — CLOPIDOGREL 75 MG: 75 TABLET, FILM COATED ORAL at 08:33

## 2019-04-24 RX ADMIN — AZTREONAM 2 G: 2 INJECTION, POWDER, FOR SOLUTION INTRAMUSCULAR; INTRAVENOUS at 20:06

## 2019-04-24 RX ADMIN — BUDESONIDE 0.5 MG: 0.5 INHALANT RESPIRATORY (INHALATION) at 10:40

## 2019-04-24 RX ADMIN — ARFORMOTEROL TARTRATE 15 MCG: 15 SOLUTION RESPIRATORY (INHALATION) at 10:41

## 2019-04-24 RX ADMIN — IPRATROPIUM BROMIDE AND ALBUTEROL SULFATE 3 ML: .5; 3 SOLUTION RESPIRATORY (INHALATION) at 13:16

## 2019-04-24 RX ADMIN — VANCOMYCIN HYDROCHLORIDE 1000 MG: 5 INJECTION, POWDER, LYOPHILIZED, FOR SOLUTION INTRAVENOUS at 08:39

## 2019-04-25 LAB
ANION GAP SERPL CALCULATED.3IONS-SCNC: 12.8 MMOL/L
BUN BLD-MCNC: 20 MG/DL (ref 8–23)
BUN/CREAT SERPL: 22 (ref 7–25)
CALCIUM SPEC-SCNC: 8.9 MG/DL (ref 8.6–10.5)
CHLORIDE SERPL-SCNC: 104 MMOL/L (ref 98–107)
CO2 SERPL-SCNC: 18.2 MMOL/L (ref 22–29)
CREAT BLD-MCNC: 0.91 MG/DL (ref 0.76–1.27)
CRP SERPL-MCNC: 4.69 MG/DL (ref 0–0.5)
GFR SERPL CREATININE-BSD FRML MDRD: 81 ML/MIN/1.73
GLUCOSE BLD-MCNC: 144 MG/DL (ref 65–99)
MAGNESIUM SERPL-MCNC: 2.4 MG/DL (ref 1.6–2.4)
POTASSIUM BLD-SCNC: 4.1 MMOL/L (ref 3.5–5.2)
SODIUM BLD-SCNC: 135 MMOL/L (ref 136–145)

## 2019-04-25 PROCEDURE — 86140 C-REACTIVE PROTEIN: CPT | Performed by: HOSPITALIST

## 2019-04-25 PROCEDURE — 94799 UNLISTED PULMONARY SVC/PX: CPT

## 2019-04-25 PROCEDURE — 87205 SMEAR GRAM STAIN: CPT | Performed by: HOSPITALIST

## 2019-04-25 PROCEDURE — 99232 SBSQ HOSP IP/OBS MODERATE 35: CPT | Performed by: HOSPITALIST

## 2019-04-25 PROCEDURE — 83735 ASSAY OF MAGNESIUM: CPT | Performed by: HOSPITALIST

## 2019-04-25 PROCEDURE — 63710000001 PREDNISONE PER 1 MG: Performed by: HOSPITALIST

## 2019-04-25 PROCEDURE — 80048 BASIC METABOLIC PNL TOTAL CA: CPT | Performed by: HOSPITALIST

## 2019-04-25 PROCEDURE — 25010000002 VANCOMYCIN 5 G RECONSTITUTED SOLUTION 5,000 MG VIAL: Performed by: HOSPITALIST

## 2019-04-25 PROCEDURE — 87070 CULTURE OTHR SPECIMN AEROBIC: CPT | Performed by: HOSPITALIST

## 2019-04-25 RX ADMIN — METOPROLOL SUCCINATE 25 MG: 25 TABLET, FILM COATED, EXTENDED RELEASE ORAL at 08:32

## 2019-04-25 RX ADMIN — ASPIRIN 81 MG: 81 TABLET ORAL at 08:32

## 2019-04-25 RX ADMIN — METOPROLOL SUCCINATE 25 MG: 25 TABLET, FILM COATED, EXTENDED RELEASE ORAL at 20:18

## 2019-04-25 RX ADMIN — BUDESONIDE 0.5 MG: 0.5 INHALANT RESPIRATORY (INHALATION) at 19:16

## 2019-04-25 RX ADMIN — PREDNISONE 40 MG: 20 TABLET ORAL at 08:32

## 2019-04-25 RX ADMIN — AZTREONAM 2 G: 2 INJECTION, POWDER, FOR SOLUTION INTRAMUSCULAR; INTRAVENOUS at 05:09

## 2019-04-25 RX ADMIN — ARFORMOTEROL TARTRATE 15 MCG: 15 SOLUTION RESPIRATORY (INHALATION) at 09:42

## 2019-04-25 RX ADMIN — DRONEDARONE 400 MG: 400 TABLET, FILM COATED ORAL at 17:51

## 2019-04-25 RX ADMIN — APIXABAN 5 MG: 5 TABLET, FILM COATED ORAL at 08:32

## 2019-04-25 RX ADMIN — ARFORMOTEROL TARTRATE 15 MCG: 15 SOLUTION RESPIRATORY (INHALATION) at 19:16

## 2019-04-25 RX ADMIN — DRONEDARONE 400 MG: 400 TABLET, FILM COATED ORAL at 08:32

## 2019-04-25 RX ADMIN — PANTOPRAZOLE SODIUM 40 MG: 40 TABLET, DELAYED RELEASE ORAL at 20:18

## 2019-04-25 RX ADMIN — IPRATROPIUM BROMIDE AND ALBUTEROL SULFATE 3 ML: .5; 3 SOLUTION RESPIRATORY (INHALATION) at 06:35

## 2019-04-25 RX ADMIN — Medication 1 CAPSULE: at 08:32

## 2019-04-25 RX ADMIN — APIXABAN 5 MG: 5 TABLET, FILM COATED ORAL at 20:17

## 2019-04-25 RX ADMIN — VANCOMYCIN HYDROCHLORIDE 1000 MG: 5 INJECTION, POWDER, LYOPHILIZED, FOR SOLUTION INTRAVENOUS at 20:15

## 2019-04-25 RX ADMIN — AZTREONAM 2 G: 2 INJECTION, POWDER, FOR SOLUTION INTRAMUSCULAR; INTRAVENOUS at 16:12

## 2019-04-25 RX ADMIN — IPRATROPIUM BROMIDE AND ALBUTEROL SULFATE 3 ML: .5; 3 SOLUTION RESPIRATORY (INHALATION) at 12:48

## 2019-04-25 RX ADMIN — PANTOPRAZOLE SODIUM 40 MG: 40 TABLET, DELAYED RELEASE ORAL at 08:32

## 2019-04-25 RX ADMIN — VANCOMYCIN HYDROCHLORIDE 1000 MG: 5 INJECTION, POWDER, LYOPHILIZED, FOR SOLUTION INTRAVENOUS at 02:04

## 2019-04-25 RX ADMIN — ATORVASTATIN CALCIUM 20 MG: 20 TABLET, FILM COATED ORAL at 20:18

## 2019-04-25 RX ADMIN — IPRATROPIUM BROMIDE AND ALBUTEROL SULFATE 3 ML: .5; 3 SOLUTION RESPIRATORY (INHALATION) at 00:54

## 2019-04-25 RX ADMIN — AZTREONAM 2 G: 2 INJECTION, POWDER, FOR SOLUTION INTRAMUSCULAR; INTRAVENOUS at 22:33

## 2019-04-25 RX ADMIN — CLOPIDOGREL 75 MG: 75 TABLET, FILM COATED ORAL at 08:32

## 2019-04-25 RX ADMIN — BUDESONIDE 0.5 MG: 0.5 INHALANT RESPIRATORY (INHALATION) at 09:43

## 2019-04-25 RX ADMIN — LEVOTHYROXINE SODIUM 100 MCG: 100 TABLET ORAL at 08:32

## 2019-04-25 RX ADMIN — SODIUM CHLORIDE, PRESERVATIVE FREE 3 ML: 5 INJECTION INTRAVENOUS at 20:18

## 2019-04-26 LAB — VANCOMYCIN TROUGH SERPL-MCNC: 10.8 MCG/ML (ref 5–20)

## 2019-04-26 PROCEDURE — 94799 UNLISTED PULMONARY SVC/PX: CPT

## 2019-04-26 PROCEDURE — 99232 SBSQ HOSP IP/OBS MODERATE 35: CPT | Performed by: HOSPITALIST

## 2019-04-26 PROCEDURE — 25010000002 VANCOMYCIN 5 G RECONSTITUTED SOLUTION 5,000 MG VIAL: Performed by: HOSPITALIST

## 2019-04-26 PROCEDURE — 63710000001 PREDNISONE PER 1 MG: Performed by: HOSPITALIST

## 2019-04-26 PROCEDURE — 80202 ASSAY OF VANCOMYCIN: CPT | Performed by: HOSPITALIST

## 2019-04-26 RX ORDER — BUMETANIDE 0.25 MG/ML
1 INJECTION INTRAMUSCULAR; INTRAVENOUS ONCE
Status: COMPLETED | OUTPATIENT
Start: 2019-04-26 | End: 2019-04-26

## 2019-04-26 RX ADMIN — IPRATROPIUM BROMIDE AND ALBUTEROL SULFATE 3 ML: .5; 3 SOLUTION RESPIRATORY (INHALATION) at 07:39

## 2019-04-26 RX ADMIN — PANTOPRAZOLE SODIUM 40 MG: 40 TABLET, DELAYED RELEASE ORAL at 20:20

## 2019-04-26 RX ADMIN — BUDESONIDE 0.5 MG: 0.5 INHALANT RESPIRATORY (INHALATION) at 07:44

## 2019-04-26 RX ADMIN — CLOPIDOGREL 75 MG: 75 TABLET, FILM COATED ORAL at 08:30

## 2019-04-26 RX ADMIN — SODIUM CHLORIDE, PRESERVATIVE FREE 3 ML: 5 INJECTION INTRAVENOUS at 20:33

## 2019-04-26 RX ADMIN — AZTREONAM 2 G: 2 INJECTION, POWDER, FOR SOLUTION INTRAMUSCULAR; INTRAVENOUS at 12:27

## 2019-04-26 RX ADMIN — IPRATROPIUM BROMIDE AND ALBUTEROL SULFATE 3 ML: .5; 3 SOLUTION RESPIRATORY (INHALATION) at 12:45

## 2019-04-26 RX ADMIN — AZTREONAM 2 G: 2 INJECTION, POWDER, FOR SOLUTION INTRAMUSCULAR; INTRAVENOUS at 06:01

## 2019-04-26 RX ADMIN — APIXABAN 5 MG: 5 TABLET, FILM COATED ORAL at 20:20

## 2019-04-26 RX ADMIN — DRONEDARONE 400 MG: 400 TABLET, FILM COATED ORAL at 18:02

## 2019-04-26 RX ADMIN — BUMETANIDE 1 MG: 0.25 INJECTION INTRAMUSCULAR; INTRAVENOUS at 12:23

## 2019-04-26 RX ADMIN — PANTOPRAZOLE SODIUM 40 MG: 40 TABLET, DELAYED RELEASE ORAL at 08:30

## 2019-04-26 RX ADMIN — METOPROLOL SUCCINATE 25 MG: 25 TABLET, FILM COATED, EXTENDED RELEASE ORAL at 20:20

## 2019-04-26 RX ADMIN — ARFORMOTEROL TARTRATE 15 MCG: 15 SOLUTION RESPIRATORY (INHALATION) at 18:34

## 2019-04-26 RX ADMIN — VANCOMYCIN HYDROCHLORIDE 1000 MG: 5 INJECTION, POWDER, LYOPHILIZED, FOR SOLUTION INTRAVENOUS at 12:37

## 2019-04-26 RX ADMIN — ATORVASTATIN CALCIUM 20 MG: 20 TABLET, FILM COATED ORAL at 20:20

## 2019-04-26 RX ADMIN — LEVOTHYROXINE SODIUM 100 MCG: 100 TABLET ORAL at 08:30

## 2019-04-26 RX ADMIN — DRONEDARONE 400 MG: 400 TABLET, FILM COATED ORAL at 08:30

## 2019-04-26 RX ADMIN — AZTREONAM 2 G: 2 INJECTION, POWDER, FOR SOLUTION INTRAMUSCULAR; INTRAVENOUS at 20:34

## 2019-04-26 RX ADMIN — PREDNISONE 40 MG: 20 TABLET ORAL at 08:30

## 2019-04-26 RX ADMIN — BUDESONIDE 0.5 MG: 0.5 INHALANT RESPIRATORY (INHALATION) at 18:34

## 2019-04-26 RX ADMIN — APIXABAN 5 MG: 5 TABLET, FILM COATED ORAL at 08:30

## 2019-04-26 RX ADMIN — IPRATROPIUM BROMIDE AND ALBUTEROL SULFATE 3 ML: .5; 3 SOLUTION RESPIRATORY (INHALATION) at 01:12

## 2019-04-26 RX ADMIN — METOPROLOL SUCCINATE 25 MG: 25 TABLET, FILM COATED, EXTENDED RELEASE ORAL at 08:30

## 2019-04-26 RX ADMIN — ASPIRIN 81 MG: 81 TABLET ORAL at 08:30

## 2019-04-26 RX ADMIN — Medication 1 CAPSULE: at 08:30

## 2019-04-27 LAB
ANION GAP SERPL CALCULATED.3IONS-SCNC: 13 MMOL/L
BACTERIA SPEC RESP CULT: NORMAL
BASOPHILS # BLD AUTO: 0.01 10*3/MM3 (ref 0–0.2)
BASOPHILS NFR BLD AUTO: 0.1 % (ref 0–1.5)
BUN BLD-MCNC: 19 MG/DL (ref 8–23)
BUN/CREAT SERPL: 22.4 (ref 7–25)
CALCIUM SPEC-SCNC: 9.2 MG/DL (ref 8.6–10.5)
CHLORIDE SERPL-SCNC: 101 MMOL/L (ref 98–107)
CO2 SERPL-SCNC: 22 MMOL/L (ref 22–29)
CREAT BLD-MCNC: 0.85 MG/DL (ref 0.76–1.27)
CRP SERPL-MCNC: 1.03 MG/DL (ref 0–0.5)
DEPRECATED RDW RBC AUTO: 53.5 FL (ref 37–54)
EOSINOPHIL # BLD AUTO: 0 10*3/MM3 (ref 0–0.4)
EOSINOPHIL NFR BLD AUTO: 0 % (ref 0.3–6.2)
ERYTHROCYTE [DISTWIDTH] IN BLOOD BY AUTOMATED COUNT: 16.6 % (ref 12.3–15.4)
GFR SERPL CREATININE-BSD FRML MDRD: 88 ML/MIN/1.73
GLUCOSE BLD-MCNC: 90 MG/DL (ref 65–99)
GRAM STN SPEC: NORMAL
HCT VFR BLD AUTO: 35.6 % (ref 37.5–51)
HGB BLD-MCNC: 11.3 G/DL (ref 13–17.7)
IMM GRANULOCYTES # BLD AUTO: 0.02 10*3/MM3 (ref 0–0.05)
IMM GRANULOCYTES NFR BLD AUTO: 0.2 % (ref 0–0.5)
LYMPHOCYTES # BLD AUTO: 1.64 10*3/MM3 (ref 0.7–3.1)
LYMPHOCYTES NFR BLD AUTO: 16.7 % (ref 19.6–45.3)
MCH RBC QN AUTO: 29.2 PG (ref 26.6–33)
MCHC RBC AUTO-ENTMCNC: 31.7 G/DL (ref 31.5–35.7)
MCV RBC AUTO: 92 FL (ref 79–97)
MONOCYTES # BLD AUTO: 1.08 10*3/MM3 (ref 0.1–0.9)
MONOCYTES NFR BLD AUTO: 11 % (ref 5–12)
NEUTROPHILS # BLD AUTO: 7.08 10*3/MM3 (ref 1.7–7)
NEUTROPHILS NFR BLD AUTO: 72 % (ref 42.7–76)
PLATELET # BLD AUTO: 256 10*3/MM3 (ref 140–450)
PMV BLD AUTO: 11 FL (ref 6–12)
POTASSIUM BLD-SCNC: 3.6 MMOL/L (ref 3.5–5.2)
POTASSIUM BLD-SCNC: 4.4 MMOL/L (ref 3.5–5.2)
RBC # BLD AUTO: 3.87 10*6/MM3 (ref 4.14–5.8)
SODIUM BLD-SCNC: 136 MMOL/L (ref 136–145)
WBC NRBC COR # BLD: 9.83 10*3/MM3 (ref 3.4–10.8)

## 2019-04-27 PROCEDURE — 94799 UNLISTED PULMONARY SVC/PX: CPT

## 2019-04-27 PROCEDURE — 85025 COMPLETE CBC W/AUTO DIFF WBC: CPT | Performed by: HOSPITALIST

## 2019-04-27 PROCEDURE — 63710000001 PREDNISONE PER 1 MG: Performed by: HOSPITALIST

## 2019-04-27 PROCEDURE — 84132 ASSAY OF SERUM POTASSIUM: CPT | Performed by: HOSPITALIST

## 2019-04-27 PROCEDURE — 25010000002 VANCOMYCIN 5 G RECONSTITUTED SOLUTION 5,000 MG VIAL: Performed by: HOSPITALIST

## 2019-04-27 PROCEDURE — 99232 SBSQ HOSP IP/OBS MODERATE 35: CPT | Performed by: HOSPITALIST

## 2019-04-27 PROCEDURE — 86140 C-REACTIVE PROTEIN: CPT | Performed by: HOSPITALIST

## 2019-04-27 PROCEDURE — 80048 BASIC METABOLIC PNL TOTAL CA: CPT | Performed by: HOSPITALIST

## 2019-04-27 RX ORDER — ACETAMINOPHEN 325 MG/1
650 TABLET ORAL EVERY 6 HOURS PRN
Status: DISCONTINUED | OUTPATIENT
Start: 2019-04-27 | End: 2019-04-28 | Stop reason: HOSPADM

## 2019-04-27 RX ORDER — LISINOPRIL 10 MG/1
10 TABLET ORAL
Status: DISCONTINUED | OUTPATIENT
Start: 2019-04-27 | End: 2019-04-28 | Stop reason: HOSPADM

## 2019-04-27 RX ORDER — POTASSIUM CHLORIDE 20 MEQ/1
40 TABLET, EXTENDED RELEASE ORAL AS NEEDED
Status: DISCONTINUED | OUTPATIENT
Start: 2019-04-27 | End: 2019-04-28 | Stop reason: HOSPADM

## 2019-04-27 RX ORDER — POTASSIUM CHLORIDE 20 MEQ/1
40 TABLET, EXTENDED RELEASE ORAL EVERY 4 HOURS
Status: COMPLETED | OUTPATIENT
Start: 2019-04-27 | End: 2019-04-27

## 2019-04-27 RX ORDER — LACTULOSE 10 G/15ML
30 SOLUTION ORAL DAILY
Status: DISCONTINUED | OUTPATIENT
Start: 2019-04-27 | End: 2019-04-27

## 2019-04-27 RX ORDER — BISACODYL 10 MG
10 SUPPOSITORY, RECTAL RECTAL ONCE
Status: DISCONTINUED | OUTPATIENT
Start: 2019-04-27 | End: 2019-04-27

## 2019-04-27 RX ORDER — POTASSIUM CHLORIDE 1.5 G/1.77G
40 POWDER, FOR SOLUTION ORAL AS NEEDED
Status: DISCONTINUED | OUTPATIENT
Start: 2019-04-27 | End: 2019-04-28 | Stop reason: HOSPADM

## 2019-04-27 RX ADMIN — DRONEDARONE 400 MG: 400 TABLET, FILM COATED ORAL at 07:15

## 2019-04-27 RX ADMIN — AZTREONAM 2 G: 2 INJECTION, POWDER, FOR SOLUTION INTRAMUSCULAR; INTRAVENOUS at 06:07

## 2019-04-27 RX ADMIN — Medication 1 CAPSULE: at 07:17

## 2019-04-27 RX ADMIN — PANTOPRAZOLE SODIUM 40 MG: 40 TABLET, DELAYED RELEASE ORAL at 20:08

## 2019-04-27 RX ADMIN — POTASSIUM CHLORIDE 40 MEQ: 1500 TABLET, EXTENDED RELEASE ORAL at 13:42

## 2019-04-27 RX ADMIN — LISINOPRIL 10 MG: 10 TABLET ORAL at 13:42

## 2019-04-27 RX ADMIN — POTASSIUM CHLORIDE 40 MEQ: 1500 TABLET, EXTENDED RELEASE ORAL at 09:04

## 2019-04-27 RX ADMIN — ASPIRIN 81 MG: 81 TABLET ORAL at 07:16

## 2019-04-27 RX ADMIN — AZTREONAM 2 G: 2 INJECTION, POWDER, FOR SOLUTION INTRAMUSCULAR; INTRAVENOUS at 20:09

## 2019-04-27 RX ADMIN — CLOPIDOGREL 75 MG: 75 TABLET, FILM COATED ORAL at 07:15

## 2019-04-27 RX ADMIN — APIXABAN 5 MG: 5 TABLET, FILM COATED ORAL at 20:09

## 2019-04-27 RX ADMIN — BUDESONIDE 0.5 MG: 0.5 INHALANT RESPIRATORY (INHALATION) at 10:35

## 2019-04-27 RX ADMIN — ATORVASTATIN CALCIUM 20 MG: 20 TABLET, FILM COATED ORAL at 20:08

## 2019-04-27 RX ADMIN — DRONEDARONE 400 MG: 400 TABLET, FILM COATED ORAL at 17:08

## 2019-04-27 RX ADMIN — VANCOMYCIN HYDROCHLORIDE 1250 MG: 5 INJECTION, POWDER, LYOPHILIZED, FOR SOLUTION INTRAVENOUS at 20:09

## 2019-04-27 RX ADMIN — PREDNISONE 40 MG: 20 TABLET ORAL at 07:15

## 2019-04-27 RX ADMIN — BUDESONIDE 0.5 MG: 0.5 INHALANT RESPIRATORY (INHALATION) at 19:02

## 2019-04-27 RX ADMIN — IPRATROPIUM BROMIDE AND ALBUTEROL SULFATE 3 ML: .5; 3 SOLUTION RESPIRATORY (INHALATION) at 07:31

## 2019-04-27 RX ADMIN — IPRATROPIUM BROMIDE AND ALBUTEROL SULFATE 3 ML: .5; 3 SOLUTION RESPIRATORY (INHALATION) at 01:49

## 2019-04-27 RX ADMIN — LEVOTHYROXINE SODIUM 100 MCG: 100 TABLET ORAL at 07:15

## 2019-04-27 RX ADMIN — METOPROLOL SUCCINATE 25 MG: 25 TABLET, FILM COATED, EXTENDED RELEASE ORAL at 07:15

## 2019-04-27 RX ADMIN — ARFORMOTEROL TARTRATE 15 MCG: 15 SOLUTION RESPIRATORY (INHALATION) at 10:35

## 2019-04-27 RX ADMIN — VANCOMYCIN HYDROCHLORIDE 1250 MG: 5 INJECTION, POWDER, LYOPHILIZED, FOR SOLUTION INTRAVENOUS at 04:56

## 2019-04-27 RX ADMIN — AZTREONAM 2 G: 2 INJECTION, POWDER, FOR SOLUTION INTRAMUSCULAR; INTRAVENOUS at 13:43

## 2019-04-27 RX ADMIN — PANTOPRAZOLE SODIUM 40 MG: 40 TABLET, DELAYED RELEASE ORAL at 07:15

## 2019-04-27 RX ADMIN — METOPROLOL SUCCINATE 25 MG: 25 TABLET, FILM COATED, EXTENDED RELEASE ORAL at 20:08

## 2019-04-27 RX ADMIN — IPRATROPIUM BROMIDE AND ALBUTEROL SULFATE 3 ML: .5; 3 SOLUTION RESPIRATORY (INHALATION) at 13:14

## 2019-04-27 RX ADMIN — ACETAMINOPHEN 650 MG: 325 TABLET, FILM COATED ORAL at 06:31

## 2019-04-27 RX ADMIN — SODIUM CHLORIDE, PRESERVATIVE FREE 3 ML: 5 INJECTION INTRAVENOUS at 07:18

## 2019-04-27 RX ADMIN — APIXABAN 5 MG: 5 TABLET, FILM COATED ORAL at 07:15

## 2019-04-27 RX ADMIN — ARFORMOTEROL TARTRATE 15 MCG: 15 SOLUTION RESPIRATORY (INHALATION) at 19:02

## 2019-04-28 VITALS
WEIGHT: 171.9 LBS | HEART RATE: 71 BPM | HEIGHT: 65 IN | TEMPERATURE: 98.3 F | DIASTOLIC BLOOD PRESSURE: 99 MMHG | RESPIRATION RATE: 18 BRPM | OXYGEN SATURATION: 96 % | SYSTOLIC BLOOD PRESSURE: 153 MMHG | BODY MASS INDEX: 28.64 KG/M2

## 2019-04-28 PROBLEM — A41.9 SEPSIS (HCC): Status: RESOLVED | Noted: 2019-04-23 | Resolved: 2019-04-28

## 2019-04-28 LAB
ANION GAP SERPL CALCULATED.3IONS-SCNC: 11.1 MMOL/L
BACTERIA SPEC AEROBE CULT: NORMAL
BACTERIA SPEC AEROBE CULT: NORMAL
BUN BLD-MCNC: 16 MG/DL (ref 8–23)
BUN/CREAT SERPL: 19.5 (ref 7–25)
CALCIUM SPEC-SCNC: 9.1 MG/DL (ref 8.6–10.5)
CHLORIDE SERPL-SCNC: 103 MMOL/L (ref 98–107)
CO2 SERPL-SCNC: 20.9 MMOL/L (ref 22–29)
CREAT BLD-MCNC: 0.82 MG/DL (ref 0.76–1.27)
GFR SERPL CREATININE-BSD FRML MDRD: 92 ML/MIN/1.73
GLUCOSE BLD-MCNC: 126 MG/DL (ref 65–99)
POTASSIUM BLD-SCNC: 4.8 MMOL/L (ref 3.5–5.2)
SODIUM BLD-SCNC: 135 MMOL/L (ref 136–145)

## 2019-04-28 PROCEDURE — 63710000001 PREDNISONE PER 1 MG: Performed by: HOSPITALIST

## 2019-04-28 PROCEDURE — 99239 HOSP IP/OBS DSCHRG MGMT >30: CPT | Performed by: HOSPITALIST

## 2019-04-28 PROCEDURE — 94799 UNLISTED PULMONARY SVC/PX: CPT

## 2019-04-28 PROCEDURE — 80048 BASIC METABOLIC PNL TOTAL CA: CPT | Performed by: HOSPITALIST

## 2019-04-28 RX ORDER — L.ACID,PARA/B.BIFIDUM/S.THERM 8B CELL
1 CAPSULE ORAL DAILY
Qty: 5 CAPSULE | Refills: 0 | Status: SHIPPED | OUTPATIENT
Start: 2019-04-29

## 2019-04-28 RX ORDER — IPRATROPIUM BROMIDE AND ALBUTEROL SULFATE 2.5; .5 MG/3ML; MG/3ML
3 SOLUTION RESPIRATORY (INHALATION) 2 TIMES DAILY
Qty: 360 ML | Refills: 12 | Status: SHIPPED | OUTPATIENT
Start: 2019-04-28

## 2019-04-28 RX ORDER — LISINOPRIL 10 MG/1
20 TABLET ORAL
Qty: 30 TABLET | Refills: 0 | Status: SHIPPED | OUTPATIENT
Start: 2019-04-29

## 2019-04-28 RX ORDER — BUDESONIDE AND FORMOTEROL FUMARATE DIHYDRATE 160; 4.5 UG/1; UG/1
2 AEROSOL RESPIRATORY (INHALATION)
Qty: 1 INHALER | Refills: 12 | Status: SHIPPED | OUTPATIENT
Start: 2019-04-28

## 2019-04-28 RX ORDER — SULFAMETHOXAZOLE AND TRIMETHOPRIM 800; 160 MG/1; MG/1
1 TABLET ORAL 2 TIMES DAILY
Qty: 4 TABLET | Refills: 0 | Status: SHIPPED | OUTPATIENT
Start: 2019-04-28

## 2019-04-28 RX ADMIN — PREDNISONE 40 MG: 20 TABLET ORAL at 08:34

## 2019-04-28 RX ADMIN — AZTREONAM 2 G: 2 INJECTION, POWDER, FOR SOLUTION INTRAMUSCULAR; INTRAVENOUS at 04:28

## 2019-04-28 RX ADMIN — LISINOPRIL 10 MG: 10 TABLET ORAL at 08:34

## 2019-04-28 RX ADMIN — APIXABAN 5 MG: 5 TABLET, FILM COATED ORAL at 08:34

## 2019-04-28 RX ADMIN — ASPIRIN 81 MG: 81 TABLET ORAL at 08:34

## 2019-04-28 RX ADMIN — IPRATROPIUM BROMIDE AND ALBUTEROL SULFATE 3 ML: .5; 3 SOLUTION RESPIRATORY (INHALATION) at 07:01

## 2019-04-28 RX ADMIN — BUDESONIDE 0.5 MG: 0.5 INHALANT RESPIRATORY (INHALATION) at 10:47

## 2019-04-28 RX ADMIN — DRONEDARONE 400 MG: 400 TABLET, FILM COATED ORAL at 08:35

## 2019-04-28 RX ADMIN — PANTOPRAZOLE SODIUM 40 MG: 40 TABLET, DELAYED RELEASE ORAL at 08:34

## 2019-04-28 RX ADMIN — LEVOTHYROXINE SODIUM 100 MCG: 100 TABLET ORAL at 08:34

## 2019-04-28 RX ADMIN — CLOPIDOGREL 75 MG: 75 TABLET, FILM COATED ORAL at 08:34

## 2019-04-28 RX ADMIN — Medication 1 CAPSULE: at 08:34

## 2019-04-28 RX ADMIN — SODIUM CHLORIDE, PRESERVATIVE FREE 3 ML: 5 INJECTION INTRAVENOUS at 08:35

## 2019-04-28 RX ADMIN — ARFORMOTEROL TARTRATE 15 MCG: 15 SOLUTION RESPIRATORY (INHALATION) at 10:47

## 2019-04-28 RX ADMIN — IPRATROPIUM BROMIDE AND ALBUTEROL SULFATE 3 ML: .5; 3 SOLUTION RESPIRATORY (INHALATION) at 00:15

## 2019-04-28 RX ADMIN — METOPROLOL SUCCINATE 25 MG: 25 TABLET, FILM COATED, EXTENDED RELEASE ORAL at 08:35

## 2019-04-29 ENCOUNTER — READMISSION MANAGEMENT (OUTPATIENT)
Dept: CALL CENTER | Facility: HOSPITAL | Age: 74
End: 2019-04-29

## 2019-04-29 NOTE — OUTREACH NOTE
Prep Survey      Responses   Facility patient discharged from?  [facility]   Is patient eligible?  Yes   Discharge diagnosis  Sepsis secondary to bilateral pneumonia treated as healthcare associated pneumonia    Does the patient have one of the following disease processes/diagnoses(primary or secondary)?  Sepsis   Does the patient have Home health ordered?  No   Is there a DME ordered?  No   Prep survey completed?  Yes          Melissa Morse RN

## 2019-04-30 ENCOUNTER — READMISSION MANAGEMENT (OUTPATIENT)
Dept: CALL CENTER | Facility: HOSPITAL | Age: 74
End: 2019-04-30

## 2019-04-30 NOTE — OUTREACH NOTE
Sepsis Week 1 Survey      Responses   Facility patient discharged from?  [facility]   Does the patient have one of the following disease processes/diagnoses(primary or secondary)?  Sepsis   Is there a successful TCM telephone encounter documented?  No   Week 1 attempt successful?  No   Unsuccessful attempts  Attempt 1          Carloz Walker RN

## 2019-05-01 ENCOUNTER — READMISSION MANAGEMENT (OUTPATIENT)
Dept: CALL CENTER | Facility: HOSPITAL | Age: 74
End: 2019-05-01

## 2019-05-01 NOTE — OUTREACH NOTE
Sepsis Week 1 Survey      Responses   Facility patient discharged from?  [facility]   Does the patient have one of the following disease processes/diagnoses(primary or secondary)?  Sepsis   Is there a successful TCM telephone encounter documented?  No   Week 1 attempt successful?  No   Unsuccessful attempts  Attempt 2          Leticia Franklin RN

## 2019-05-08 ENCOUNTER — READMISSION MANAGEMENT (OUTPATIENT)
Dept: CALL CENTER | Facility: HOSPITAL | Age: 74
End: 2019-05-08

## 2019-05-08 NOTE — OUTREACH NOTE
Sepsis Week 2 Survey      Responses   Facility patient discharged from?  [facility]   Does the patient have one of the following disease processes/diagnoses(primary or secondary)?  Sepsis   Week 2 attempt successful?  No   Unsuccessful attempts  Attempt 1          Maria Eugenia Mccray RN

## 2019-05-09 ENCOUNTER — READMISSION MANAGEMENT (OUTPATIENT)
Dept: CALL CENTER | Facility: HOSPITAL | Age: 74
End: 2019-05-09

## 2022-02-19 ENCOUNTER — HOSPITAL ENCOUNTER (INPATIENT)
Dept: HOSPITAL 79 - ER1 | Age: 77
LOS: 4 days | Discharge: HOME | DRG: 193 | End: 2022-02-23
Attending: INTERNAL MEDICINE | Admitting: INTERNAL MEDICINE
Payer: MEDICARE

## 2022-02-19 VITALS — WEIGHT: 175 LBS | BODY MASS INDEX: 24.5 KG/M2 | HEIGHT: 71 IN

## 2022-02-19 DIAGNOSIS — Z82.49: ICD-10-CM

## 2022-02-19 DIAGNOSIS — I50.33: ICD-10-CM

## 2022-02-19 DIAGNOSIS — K21.9: ICD-10-CM

## 2022-02-19 DIAGNOSIS — J96.21: ICD-10-CM

## 2022-02-19 DIAGNOSIS — I11.0: ICD-10-CM

## 2022-02-19 DIAGNOSIS — I25.10: ICD-10-CM

## 2022-02-19 DIAGNOSIS — E03.9: ICD-10-CM

## 2022-02-19 DIAGNOSIS — Z90.49: ICD-10-CM

## 2022-02-19 DIAGNOSIS — Z99.81: ICD-10-CM

## 2022-02-19 DIAGNOSIS — Z66: ICD-10-CM

## 2022-02-19 DIAGNOSIS — J44.1: ICD-10-CM

## 2022-02-19 DIAGNOSIS — J44.0: ICD-10-CM

## 2022-02-19 DIAGNOSIS — J18.9: Primary | ICD-10-CM

## 2022-02-19 DIAGNOSIS — F17.210: ICD-10-CM

## 2022-02-19 DIAGNOSIS — Z20.822: ICD-10-CM

## 2022-02-19 DIAGNOSIS — C81.90: ICD-10-CM

## 2022-02-19 DIAGNOSIS — D69.6: ICD-10-CM

## 2022-02-19 LAB
BUN/CREATININE RATIO: 24 (ref 0–10)
HGB BLD-MCNC: 12.9 GM/DL (ref 14–17.5)
RED BLOOD COUNT: 3.76 M/UL (ref 4.2–5.5)
WHITE BLOOD COUNT: 19.8 K/UL (ref 4.5–11)

## 2022-02-20 LAB
BUN/CREATININE RATIO: 29 (ref 0–10)
HGB BLD-MCNC: 12.4 GM/DL (ref 14–17.5)
RED BLOOD COUNT: 3.62 M/UL (ref 4.2–5.5)
WHITE BLOOD COUNT: 15.2 K/UL (ref 4.5–11)

## 2022-02-21 LAB
BUN/CREATININE RATIO: 35 (ref 0–10)
HGB BLD-MCNC: 12.6 GM/DL (ref 14–17.5)
RED BLOOD COUNT: 3.63 M/UL (ref 4.2–5.5)
WHITE BLOOD COUNT: 13.6 K/UL (ref 4.5–11)

## 2022-02-22 LAB
BUN/CREATININE RATIO: 28 (ref 0–10)
HGB BLD-MCNC: 13.6 GM/DL (ref 14–17.5)
RED BLOOD COUNT: 4.05 M/UL (ref 4.2–5.5)
WHITE BLOOD COUNT: 8.7 K/UL (ref 4.5–11)

## 2022-02-23 LAB
BUN/CREATININE RATIO: 25 (ref 0–10)
HGB BLD-MCNC: 14.1 GM/DL (ref 14–17.5)
RED BLOOD COUNT: 4.1 M/UL (ref 4.2–5.5)
WHITE BLOOD COUNT: 7.2 K/UL (ref 4.5–11)

## 2022-05-08 ENCOUNTER — HOSPITAL ENCOUNTER (INPATIENT)
Dept: HOSPITAL 79 - ER1 | Age: 77
LOS: 3 days | Discharge: HOME | DRG: 682 | End: 2022-05-11
Attending: STUDENT IN AN ORGANIZED HEALTH CARE EDUCATION/TRAINING PROGRAM | Admitting: EMERGENCY MEDICINE
Payer: MEDICARE

## 2022-05-08 VITALS — WEIGHT: 171.06 LBS | BODY MASS INDEX: 28.5 KG/M2 | HEIGHT: 65 IN

## 2022-05-08 DIAGNOSIS — I11.0: ICD-10-CM

## 2022-05-08 DIAGNOSIS — Z90.49: ICD-10-CM

## 2022-05-08 DIAGNOSIS — E03.9: ICD-10-CM

## 2022-05-08 DIAGNOSIS — Z87.891: ICD-10-CM

## 2022-05-08 DIAGNOSIS — J69.0: ICD-10-CM

## 2022-05-08 DIAGNOSIS — Z79.01: ICD-10-CM

## 2022-05-08 DIAGNOSIS — Z99.81: ICD-10-CM

## 2022-05-08 DIAGNOSIS — Z85.71: ICD-10-CM

## 2022-05-08 DIAGNOSIS — J96.21: ICD-10-CM

## 2022-05-08 DIAGNOSIS — Z79.82: ICD-10-CM

## 2022-05-08 DIAGNOSIS — I48.0: ICD-10-CM

## 2022-05-08 DIAGNOSIS — E87.6: ICD-10-CM

## 2022-05-08 DIAGNOSIS — Z20.822: ICD-10-CM

## 2022-05-08 DIAGNOSIS — R57.0: ICD-10-CM

## 2022-05-08 DIAGNOSIS — I21.A1: ICD-10-CM

## 2022-05-08 DIAGNOSIS — I50.32: ICD-10-CM

## 2022-05-08 DIAGNOSIS — R00.1: ICD-10-CM

## 2022-05-08 DIAGNOSIS — N17.9: Primary | ICD-10-CM

## 2022-05-08 DIAGNOSIS — I47.1: ICD-10-CM

## 2022-05-08 DIAGNOSIS — I25.10: ICD-10-CM

## 2022-05-08 DIAGNOSIS — Z95.5: ICD-10-CM

## 2022-05-08 DIAGNOSIS — J44.9: ICD-10-CM

## 2022-05-08 DIAGNOSIS — Z82.49: ICD-10-CM

## 2022-05-08 DIAGNOSIS — E86.0: ICD-10-CM

## 2022-05-08 PROCEDURE — U0002 COVID-19 LAB TEST NON-CDC: HCPCS

## 2022-05-09 LAB
BUN/CREATININE RATIO: 16 (ref 0–10)
BUN/CREATININE RATIO: 21 (ref 0–10)
HGB BLD-MCNC: 13.6 GM/DL (ref 14–17.5)
RED BLOOD COUNT: 3.93 M/UL (ref 4.2–5.5)
WHITE BLOOD COUNT: 9 K/UL (ref 4.5–11)

## 2022-05-10 LAB
BUN/CREATININE RATIO: 24 (ref 0–10)
HGB BLD-MCNC: 13.9 GM/DL (ref 14–17.5)
RED BLOOD COUNT: 4.01 M/UL (ref 4.2–5.5)
WHITE BLOOD COUNT: 12.8 K/UL (ref 4.5–11)

## 2022-05-10 NOTE — NUR
AT APPROXIMATELY 2200, PT BEGAN COMPLAINING OF INCREASED DIFFICULTY BREATHING.
PHYSICIAN NOTIFIED, IV FLUIDS DISCONTINUED AND IV LASIX ADMINISTERED. URINE
OUTPUT 700ML. PT PUT ON 2L NC FOR COMFORT, O2 CONTINUES TO STAY %.
PHYSICIAN ARRIVED TO FLOOR, EXAMINED PT. CHEST X RAY ORDERED, AM LABS ORDERED,
40MG IV LASIX ADMINISTERED. PT STATES HIS BREATHING IS LESS DIFFICULT AT THIS
TIME, RESTING COMFORTABLY WITH CALL MCCOY AT BEDSIDE.

## 2022-05-11 LAB
BUN/CREATININE RATIO: 24 (ref 0–10)
HGB BLD-MCNC: 13 GM/DL (ref 14–17.5)
RED BLOOD COUNT: 3.85 M/UL (ref 4.2–5.5)
WHITE BLOOD COUNT: 9.5 K/UL (ref 4.5–11)

## 2022-09-24 ENCOUNTER — HOSPITAL ENCOUNTER (EMERGENCY)
Dept: HOSPITAL 79 - ER1 | Age: 77
Discharge: LEFT BEFORE DISCHARGE | End: 2022-09-24
Payer: MEDICARE

## 2022-09-24 DIAGNOSIS — Z53.21: Primary | ICD-10-CM
